# Patient Record
Sex: FEMALE | Race: WHITE | URBAN - METROPOLITAN AREA
[De-identification: names, ages, dates, MRNs, and addresses within clinical notes are randomized per-mention and may not be internally consistent; named-entity substitution may affect disease eponyms.]

---

## 2017-05-09 ENCOUNTER — INPATIENT (INPATIENT)
Facility: HOSPITAL | Age: 82
LOS: 2 days | Discharge: HOME | DRG: 292 | End: 2017-05-12
Attending: INTERNAL MEDICINE | Admitting: HOSPITALIST
Payer: MEDICARE

## 2017-05-09 VITALS
SYSTOLIC BLOOD PRESSURE: 144 MMHG | RESPIRATION RATE: 18 BRPM | OXYGEN SATURATION: 95 % | HEART RATE: 79 BPM | DIASTOLIC BLOOD PRESSURE: 62 MMHG | TEMPERATURE: 99 F

## 2017-05-09 LAB
ALBUMIN SERPL ELPH-MCNC: 3 G/DL — LOW (ref 3.3–5)
ALP SERPL-CCNC: 83 U/L — SIGNIFICANT CHANGE UP (ref 40–120)
ALT FLD-CCNC: 25 U/L RC — SIGNIFICANT CHANGE UP (ref 10–45)
ANION GAP SERPL CALC-SCNC: 12 MMOL/L — SIGNIFICANT CHANGE UP (ref 5–17)
APPEARANCE UR: CLEAR — SIGNIFICANT CHANGE UP
APTT BLD: 28.9 SEC — SIGNIFICANT CHANGE UP (ref 27.5–37.4)
AST SERPL-CCNC: 77 U/L — HIGH (ref 10–40)
BASOPHILS # BLD AUTO: 0.1 K/UL — SIGNIFICANT CHANGE UP (ref 0–0.2)
BILIRUB SERPL-MCNC: 0.7 MG/DL — SIGNIFICANT CHANGE UP (ref 0.2–1.2)
BILIRUB UR-MCNC: NEGATIVE — SIGNIFICANT CHANGE UP
BUN SERPL-MCNC: 21 MG/DL — SIGNIFICANT CHANGE UP (ref 7–23)
CALCIUM SERPL-MCNC: 8.6 MG/DL — SIGNIFICANT CHANGE UP (ref 8.4–10.5)
CHLORIDE SERPL-SCNC: 99 MMOL/L — SIGNIFICANT CHANGE UP (ref 96–108)
CO2 SERPL-SCNC: 22 MMOL/L — SIGNIFICANT CHANGE UP (ref 22–31)
COLOR SPEC: YELLOW — SIGNIFICANT CHANGE UP
CREAT SERPL-MCNC: 0.86 MG/DL — SIGNIFICANT CHANGE UP (ref 0.5–1.3)
DIFF PNL FLD: ABNORMAL
EOSINOPHIL # BLD AUTO: 0 K/UL — SIGNIFICANT CHANGE UP (ref 0–0.5)
GAS PNL BLDV: SIGNIFICANT CHANGE UP
GLUCOSE SERPL-MCNC: 123 MG/DL — HIGH (ref 70–99)
GLUCOSE UR QL: NEGATIVE — SIGNIFICANT CHANGE UP
HCT VFR BLD CALC: 34.5 % — SIGNIFICANT CHANGE UP (ref 34.5–45)
HGB BLD-MCNC: 11.8 G/DL — SIGNIFICANT CHANGE UP (ref 11.5–15.5)
INR BLD: 1.23 RATIO — HIGH (ref 0.88–1.16)
KETONES UR-MCNC: NEGATIVE — SIGNIFICANT CHANGE UP
LEUKOCYTE ESTERASE UR-ACNC: ABNORMAL
LYMPHOCYTES # BLD AUTO: 0.9 K/UL — LOW (ref 1–3.3)
LYMPHOCYTES # BLD AUTO: 9 % — LOW (ref 13–44)
MCHC RBC-ENTMCNC: 29 PG — SIGNIFICANT CHANGE UP (ref 27–34)
MCHC RBC-ENTMCNC: 34.2 GM/DL — SIGNIFICANT CHANGE UP (ref 32–36)
MCV RBC AUTO: 84.8 FL — SIGNIFICANT CHANGE UP (ref 80–100)
MONOCYTES # BLD AUTO: 0.4 K/UL — SIGNIFICANT CHANGE UP (ref 0–0.9)
MONOCYTES NFR BLD AUTO: 9 % — SIGNIFICANT CHANGE UP (ref 2–14)
NEUTROPHILS # BLD AUTO: 12.7 K/UL — HIGH (ref 1.8–7.4)
NEUTROPHILS NFR BLD AUTO: 78 % — HIGH (ref 43–77)
NITRITE UR-MCNC: NEGATIVE — SIGNIFICANT CHANGE UP
PH UR: 6 — SIGNIFICANT CHANGE UP (ref 5–8)
PLATELET # BLD AUTO: 129 K/UL — LOW (ref 150–400)
POTASSIUM SERPL-MCNC: 6.4 MMOL/L — CRITICAL HIGH (ref 3.5–5.3)
POTASSIUM SERPL-SCNC: 6.4 MMOL/L — CRITICAL HIGH (ref 3.5–5.3)
PROT SERPL-MCNC: 8.9 G/DL — HIGH (ref 6–8.3)
PROT UR-MCNC: 150 MG/DL
PROTHROM AB SERPL-ACNC: 13.3 SEC — HIGH (ref 9.8–12.7)
RBC # BLD: 4.07 M/UL — SIGNIFICANT CHANGE UP (ref 3.8–5.2)
RBC # FLD: 15.2 % — HIGH (ref 10.3–14.5)
SODIUM SERPL-SCNC: 133 MMOL/L — LOW (ref 135–145)
SP GR SPEC: 1.02 — SIGNIFICANT CHANGE UP (ref 1.01–1.02)
UROBILINOGEN FLD QL: NEGATIVE — SIGNIFICANT CHANGE UP
WBC # BLD: 14.4 K/UL — HIGH (ref 3.8–10.5)
WBC # FLD AUTO: 14.4 K/UL — HIGH (ref 3.8–10.5)

## 2017-05-09 PROCEDURE — 99285 EMERGENCY DEPT VISIT HI MDM: CPT

## 2017-05-09 PROCEDURE — 71010: CPT | Mod: 26

## 2017-05-09 RX ORDER — VANCOMYCIN HCL 1 G
1000 VIAL (EA) INTRAVENOUS ONCE
Qty: 0 | Refills: 0 | Status: COMPLETED | OUTPATIENT
Start: 2017-05-09 | End: 2017-05-09

## 2017-05-09 RX ORDER — SODIUM CHLORIDE 9 MG/ML
1000 INJECTION INTRAMUSCULAR; INTRAVENOUS; SUBCUTANEOUS ONCE
Qty: 0 | Refills: 0 | Status: COMPLETED | OUTPATIENT
Start: 2017-05-09 | End: 2017-05-09

## 2017-05-09 RX ADMIN — Medication 250 MILLIGRAM(S): at 23:45

## 2017-05-09 RX ADMIN — SODIUM CHLORIDE 1000 MILLILITER(S): 9 INJECTION INTRAMUSCULAR; INTRAVENOUS; SUBCUTANEOUS at 23:45

## 2017-05-09 RX ADMIN — SODIUM CHLORIDE 1000 MILLILITER(S): 9 INJECTION INTRAMUSCULAR; INTRAVENOUS; SUBCUTANEOUS at 22:33

## 2017-05-09 NOTE — ED PROVIDER NOTE - ATTENDING CONTRIBUTION TO CARE
I, Dr. Adrien Gee, saw and examined this patient and discussed the plan of care with the PA.  Elderly patient, dementia, high-functioning usually per family, presents with worsening reddness and swelling of left lower leg assoc with very poor intake and decreased activity over past 24 hours.  No vomiting, cp, sob.  Appears well, slightly dry mm, warm erythematous anterior left lower leg and midfoot with irregular borders c/w cellulitis, NV intact. I, Dr. Adrien Gee, saw and examined this patient and discussed the plan of care with the PA.  Elderly patient, dementia, high-functioning usually per family, presents with worsening reddness and swelling of left lower leg assoc with very poor intake and decreased activity over past 24 hours.  No vomiting, cp, sob.  Appears well, slightly dry mm, warm erythematous anterior left lower leg and midfoot with irregular borders c/w cellulitis, NV intact, has 4/6 systolic murmur L sternal border (states PCP mentioned this was new few weeks ago, family elected not to pursue this).

## 2017-05-09 NOTE — ED ADULT TRIAGE NOTE - CHIEF COMPLAINT QUOTE
dec po tolerance, lethargy, not taking meds} x since last night, (hx insomnia, dementia), dtr denies new confusion, hemiparesis

## 2017-05-09 NOTE — ED PROVIDER NOTE - MEDICAL DECISION MAKING DETAILS
Dr. Gee Note: left leg cellulitis in elderly with poor intake...will likely need admission for IV antibx and IV hydration.  Will check u/s for occult DVT as cause as well.

## 2017-05-09 NOTE — ED PROVIDER NOTE - OBJECTIVE STATEMENT
87 y/o F pmhx dementia, HTN, depression, presenting BIBEMS for daughters' concern for increased weakness and sleepiness as well as decreased PO intake and new onset of redness on left lower leg. Daughters provide history as patient has significant dementia, stating that patient is ambulatory in home without assistance at baseline, dressing and feeding herself as well. For last 2 days patient has become significantly weaker 89 y/o F pmhx dementia, HTN, depression, presenting BIBEMS for daughters' concern for increased weakness and sleepiness as well as decreased PO intake and new onset of redness on left lower leg. Daughters provide history as patient has significant dementia, stating that patient is ambulatory in home without assistance at baseline, dressing and feeding herself as well. For last 2 days patient has become significantly weaker and unable to get around independently which has become concerning for them. State that she has not wanted to eat or drink as well secondary to her weakness, and they noted that she had redness and pain to her LLE. Pt has been complaining of pain when trying to walk secondary to this redness. She denies any fevers or chills. Denies nausea, vomiting.

## 2017-05-09 NOTE — ED ADULT NURSE NOTE - OBJECTIVE STATEMENT
89 yo female A&OX3 presents to the ED with the c/o weakness. Pt has a hx of dementia, as per family members pt has been lethargic, weak and is having decrease po intake. Pt has redness and warmth to the LLE. As per family pt normally gets redness on her skin when she gets " bite by insects". No broken skin, no drainage or pus. Pt appears calm, resting in stretcher. Family states that pt has a hx of uti. No fevers or chills. Lungs clear, equal b/l no sob and no cp. Abd round, soft non tender and non distended.

## 2017-05-09 NOTE — ED PROVIDER NOTE - PHYSICAL EXAMINATION
GEN: Well Appearing, Nontoxic, NAD  HEENT: Symm Facies, PERRL, EOMI, MMM, posterior pharynx clear  CV: No JVD/Bruits or stridor;  RRR w/o m/g/r  RESP: CTAB w/o w/r/r  ABD: Soft, nt/nd, +bs, no guarding/rebound, no RUQ tender;  No CVAT  EXT: No lower extremity edema or calf tenderness. WWP, palpable pulses. FROMx4  SKIN: +erythema, warmth, mild swelling to shin. +ttp of area. 2+ pedal pulse.   Neuro: Grossly intact, AOX3 with normal speech, CN II-XII intact; Sensation intact, motor 5/5 throughout; gait normal

## 2017-05-09 NOTE — ED PROVIDER NOTE - CARE PLAN
Principal Discharge DX:	Cellulitis Principal Discharge DX:	Cellulitis of lower leg  Goal:	left leg cellulitis

## 2017-05-10 DIAGNOSIS — I50.1 LEFT VENTRICULAR FAILURE, UNSPECIFIED: ICD-10-CM

## 2017-05-10 DIAGNOSIS — G30.9 ALZHEIMER'S DISEASE, UNSPECIFIED: ICD-10-CM

## 2017-05-10 DIAGNOSIS — I10 ESSENTIAL (PRIMARY) HYPERTENSION: ICD-10-CM

## 2017-05-10 DIAGNOSIS — L03.119 CELLULITIS OF UNSPECIFIED PART OF LIMB: ICD-10-CM

## 2017-05-10 DIAGNOSIS — Z29.9 ENCOUNTER FOR PROPHYLACTIC MEASURES, UNSPECIFIED: ICD-10-CM

## 2017-05-10 DIAGNOSIS — Z92.89 PERSONAL HISTORY OF OTHER MEDICAL TREATMENT: Chronic | ICD-10-CM

## 2017-05-10 DIAGNOSIS — Z71.89 OTHER SPECIFIED COUNSELING: ICD-10-CM

## 2017-05-10 DIAGNOSIS — R82.90 UNSPECIFIED ABNORMAL FINDINGS IN URINE: ICD-10-CM

## 2017-05-10 LAB
CK MB BLD-MCNC: 1.2 % — SIGNIFICANT CHANGE UP (ref 0–3.5)
CK MB BLD-MCNC: 1.6 % — SIGNIFICANT CHANGE UP (ref 0–3.5)
CK MB CFR SERPL CALC: 2.9 NG/ML — SIGNIFICANT CHANGE UP (ref 0–3.8)
CK MB CFR SERPL CALC: 3.3 NG/ML — SIGNIFICANT CHANGE UP (ref 0–3.8)
CK SERPL-CCNC: 208 U/L — HIGH (ref 25–170)
CK SERPL-CCNC: 238 U/L — HIGH (ref 25–170)
HCT VFR BLD CALC: 33.8 % — LOW (ref 34.5–45)
HGB BLD-MCNC: 11.1 G/DL — LOW (ref 11.5–15.5)
MCHC RBC-ENTMCNC: 28.5 PG — SIGNIFICANT CHANGE UP (ref 27–34)
MCHC RBC-ENTMCNC: 32.9 GM/DL — SIGNIFICANT CHANGE UP (ref 32–36)
MCV RBC AUTO: 86.6 FL — SIGNIFICANT CHANGE UP (ref 80–100)
NT-PROBNP SERPL-SCNC: HIGH PG/ML (ref 0–300)
PLATELET # BLD AUTO: 122 K/UL — LOW (ref 150–400)
RBC # BLD: 3.9 M/UL — SIGNIFICANT CHANGE UP (ref 3.8–5.2)
RBC # FLD: 15 % — HIGH (ref 10.3–14.5)
TROPONIN T SERPL-MCNC: <0.01 NG/ML — SIGNIFICANT CHANGE UP (ref 0–0.06)
TROPONIN T SERPL-MCNC: <0.01 — SIGNIFICANT CHANGE UP (ref 0–0.06)
WBC # BLD: 13.7 K/UL — HIGH (ref 3.8–10.5)
WBC # FLD AUTO: 13.7 K/UL — HIGH (ref 3.8–10.5)

## 2017-05-10 PROCEDURE — 99497 ADVNCD CARE PLAN 30 MIN: CPT | Mod: 25,GC

## 2017-05-10 PROCEDURE — 71250 CT THORAX DX C-: CPT | Mod: 26

## 2017-05-10 PROCEDURE — 99223 1ST HOSP IP/OBS HIGH 75: CPT | Mod: GC

## 2017-05-10 PROCEDURE — 93971 EXTREMITY STUDY: CPT | Mod: 26

## 2017-05-10 RX ORDER — AMLODIPINE BESYLATE 2.5 MG/1
5 TABLET ORAL DAILY
Qty: 0 | Refills: 0 | Status: DISCONTINUED | OUTPATIENT
Start: 2017-05-10 | End: 2017-05-10

## 2017-05-10 RX ORDER — FUROSEMIDE 40 MG
40 TABLET ORAL ONCE
Qty: 0 | Refills: 0 | Status: COMPLETED | OUTPATIENT
Start: 2017-05-10 | End: 2017-05-10

## 2017-05-10 RX ORDER — HYDRALAZINE HCL 50 MG
25 TABLET ORAL ONCE
Qty: 0 | Refills: 0 | Status: DISCONTINUED | OUTPATIENT
Start: 2017-05-10 | End: 2017-05-10

## 2017-05-10 RX ORDER — FUROSEMIDE 40 MG
20 TABLET ORAL DAILY
Qty: 0 | Refills: 0 | Status: DISCONTINUED | OUTPATIENT
Start: 2017-05-10 | End: 2017-05-10

## 2017-05-10 RX ORDER — SODIUM CHLORIDE 9 MG/ML
1000 INJECTION, SOLUTION INTRAVENOUS
Qty: 0 | Refills: 0 | Status: DISCONTINUED | OUTPATIENT
Start: 2017-05-10 | End: 2017-05-10

## 2017-05-10 RX ORDER — HALOPERIDOL DECANOATE 100 MG/ML
1 INJECTION INTRAMUSCULAR ONCE
Qty: 0 | Refills: 0 | Status: COMPLETED | OUTPATIENT
Start: 2017-05-10 | End: 2017-05-10

## 2017-05-10 RX ORDER — ENOXAPARIN SODIUM 100 MG/ML
40 INJECTION SUBCUTANEOUS EVERY 24 HOURS
Qty: 0 | Refills: 0 | Status: DISCONTINUED | OUTPATIENT
Start: 2017-05-10 | End: 2017-05-12

## 2017-05-10 RX ORDER — ACETAMINOPHEN 500 MG
650 TABLET ORAL EVERY 6 HOURS
Qty: 0 | Refills: 0 | Status: DISCONTINUED | OUTPATIENT
Start: 2017-05-10 | End: 2017-05-12

## 2017-05-10 RX ORDER — HALOPERIDOL DECANOATE 100 MG/ML
0.5 INJECTION INTRAMUSCULAR ONCE
Qty: 0 | Refills: 0 | Status: DISCONTINUED | OUTPATIENT
Start: 2017-05-10 | End: 2017-05-10

## 2017-05-10 RX ORDER — ONDANSETRON 8 MG/1
4 TABLET, FILM COATED ORAL EVERY 6 HOURS
Qty: 0 | Refills: 0 | Status: DISCONTINUED | OUTPATIENT
Start: 2017-05-10 | End: 2017-05-10

## 2017-05-10 RX ORDER — HYDRALAZINE HCL 50 MG
25 TABLET ORAL THREE TIMES A DAY
Qty: 0 | Refills: 0 | Status: DISCONTINUED | OUTPATIENT
Start: 2017-05-10 | End: 2017-05-11

## 2017-05-10 RX ORDER — CEFUROXIME AXETIL 250 MG
500 TABLET ORAL EVERY 12 HOURS
Qty: 0 | Refills: 0 | Status: DISCONTINUED | OUTPATIENT
Start: 2017-05-10 | End: 2017-05-10

## 2017-05-10 RX ORDER — HYDRALAZINE HCL 50 MG
5 TABLET ORAL ONCE
Qty: 0 | Refills: 0 | Status: COMPLETED | OUTPATIENT
Start: 2017-05-10 | End: 2017-05-10

## 2017-05-10 RX ORDER — CEFUROXIME AXETIL 250 MG
750 TABLET ORAL EVERY 8 HOURS
Qty: 0 | Refills: 0 | Status: DISCONTINUED | OUTPATIENT
Start: 2017-05-10 | End: 2017-05-12

## 2017-05-10 RX ADMIN — Medication 25 MILLIGRAM(S): at 21:21

## 2017-05-10 RX ADMIN — Medication 20 MILLIGRAM(S): at 06:01

## 2017-05-10 RX ADMIN — Medication 5 MILLIGRAM(S): at 16:02

## 2017-05-10 RX ADMIN — AMLODIPINE BESYLATE 5 MILLIGRAM(S): 2.5 TABLET ORAL at 14:12

## 2017-05-10 RX ADMIN — Medication 500 MILLIGRAM(S): at 12:22

## 2017-05-10 RX ADMIN — Medication 100 MILLIGRAM(S): at 23:51

## 2017-05-10 RX ADMIN — ENOXAPARIN SODIUM 40 MILLIGRAM(S): 100 INJECTION SUBCUTANEOUS at 06:01

## 2017-05-10 RX ADMIN — Medication 40 MILLIGRAM(S): at 19:23

## 2017-05-10 RX ADMIN — HALOPERIDOL DECANOATE 1 MILLIGRAM(S): 100 INJECTION INTRAMUSCULAR at 23:10

## 2017-05-10 NOTE — H&P ADULT. - FAMILY HISTORY
Mother  Still living? Unknown  Family history of diabetes mellitus, Age at diagnosis: Age Unknown     Father  Still living? Unknown  Family history of essential hypertension, Age at diagnosis: Age Unknown

## 2017-05-10 NOTE — H&P ADULT. - PROBLEM SELECTOR PLAN 5
Patient has had dementia for quite some time without medication. Her baseline is such that she can make her needs known at the moment and can remember past events; however, cannot make new memories.   - No benefit to adding donepezil or memantine at this point  - If patient becomes agitated, cannot use antipsychotics 2/2 prolonged QTc Patient has had dementia for quite some time without medication. Her baseline is such that she can make her needs known at the moment and can remember past events; however, cannot make new memories.   - No benefit to adding donepezil or memantine at this point  - If patient becomes agitated, cannot use antipsychotics 2/2 prolonged QTc  - pt is prone to wandering, enhanced supervision

## 2017-05-10 NOTE — H&P ADULT. - PROBLEM SELECTOR PLAN 4
Patient currently at goal of < 150/90 without medication  - can trend VS throughout admission. No need to start new anti-HTN at this moment

## 2017-05-10 NOTE — H&P ADULT. - LYMPHATIC
anterior cervical L/posterior cervical R/posterior cervical L/supraclavicular L/anterior cervical R/supraclavicular R

## 2017-05-10 NOTE — H&P ADULT. - NEGATIVE OPHTHALMOLOGIC SYMPTOMS
no lacrimation L/no discharge L/no lacrimation R/no photophobia/no diplopia/no blurred vision R/no blurred vision L

## 2017-05-10 NOTE — H&P ADULT. - NEGATIVE CARDIOVASCULAR SYMPTOMS
no orthopnea/no dyspnea on exertion/no paroxysmal nocturnal dyspnea/no peripheral edema/no palpitations/no chest pain

## 2017-05-10 NOTE — H&P ADULT. - LAB RESULTS AND INTERPRETATION
labs reviewed and interpreted personally: leukocytosis to 14.4 with 4% bands and neutrophil predominance. H/H unremarkable. Platelets slights decreased at 132, no baseline to compare to. Coags slightly elevated with INR 1.32 and PT 13.3. Electrolytes cannot be interpreted as of now 2/2 gross specimen hemolysis. Repeat BMP has been sent. Creatinine preserved at 0.86. Patient normoglycemic. Elevated total protein of 8.9 with albumin of 3.0 yielding a gamma gap of ~6 which is likely 2/2 dehydration but could represent an elevated paraproteinemia. UA demonstrates mod bacteria, positive LE with hematuria and proteinuria c/w UTI.

## 2017-05-10 NOTE — H&P ADULT. - RADIOLOGY RESULTS AND INTERPRETATION
Images reviewed and interpreted personally. Official read pending. CXR demonstrated b/l non-specific opacities that could be increased vascular markings with small pleural effusions. CT w/o contrast shows mild pleural effusions with atelectasis and increased vascularity. Doubt pneumonia. Images reviewed and interpreted personally. Official read pending. CXR demonstrated b/l non-specific opacities that could be increased vascular markings with small pleural effusions. CT w/o contrast shows mild pleural effusions with atelectasis and increased vascularity. Doubt pneumonia. Cardiomegaly present.

## 2017-05-10 NOTE — H&P ADULT. - EKG AND INTERPRETATION
NSR at 77. LAE. QTc prolongation at 516 Personally reviewed EKG. NSR at 77. LAE. QTc prolongation at 516. RBBB.

## 2017-05-10 NOTE — H&P ADULT. - PROBLEM SELECTOR PLAN 6
lovenox - pt's two daughters are decision makers  - discussed GOC with Maureen Anna  - pt to be DNR but not DNI  - full medical management, but daughter would like overall conservative measures.

## 2017-05-10 NOTE — H&P ADULT. - HISTORY OF PRESENT ILLNESS
88F PMH HTN, depression, dementia presents with 2 day history of increasing weakness, sleepiness, decreased po intake and redness of the LLE. Daughter brought in patient by ambulance stating that her mother, at baseline, is interactive, self-ambulatory and can perform all ADL's independently. Over the last 2 days however, her mother is now having problems walking, endorsing pain with ambulation, is feeling weak and due to this weakness she is not eating very much.     In ED: T: 97.7  BP: 132/59  HR: 81  RR: 17  SpO2: 96%RA. Patient provided Vancomycin x 1, 2L NS bolus, CXR and CT chest w/o contrast. LLE duplex is pending and BCx and UCx have been sent. 88F PMH HTN, depression, dementia presents with 2 day history of increasing weakness, sleepiness, decreased po intake and redness of the LLE. Daughter brought in patient by ambulance stating that her mother, at baseline, is interactive, self-ambulatory and can perform all ADL's independently. Patient cannot form new memories; however, she is very verbal and can make her desires and needs known in the moment. Over the last 2 days however, her mother is now having problems walking, endorsing pain with ambulation, is feeling weak and due to this weakness she is not eating very much. Over the last 4 months the patient has been having a dry cough that has morphed into a wet cough that is not productive of sputum. 1 year ago, the patient developed a new heart murmur that has not received proper workup. The patient currently denies SOB, CP, fevers, chills, N/V/D/C, vision changes, hearing changes, weakness, muscle pain, joint pain, dysuria, hematuria, or HA.     In ED: T: 97.7  BP: 132/59  HR: 81  RR: 17  SpO2: 96%RA. Patient provided Vancomycin x 1, 2L NS bolus, CXR and CT chest w/o contrast. LLE duplex is pending and BCx and UCx have been sent.

## 2017-05-10 NOTE — H&P ADULT. - ASSESSMENT
88F PMH HTN, depression, dementia presents with 2 day history of increasing weakness, sleepiness, decreased po intake and redness of the LLE in the context of leukocytosis and UA consistent with UTI process. 88F PMH HTN, depression, dementia presents with 2 day history of increasing weakness, sleepiness, decreased po intake and redness of the LLE in the context of leukocytosis and pulmonary edema with new heart murmur and cardiomegaly seen on imaging.

## 2017-05-10 NOTE — H&P ADULT. - PROBLEM SELECTOR PLAN 1
Patient presents with new cardiac murmur, 4 months of a wet cough and a sudden decrease in energy and functional status with evidence of pulmonary edema, increased vascular markings on imaging with cardiomegaly. EKG is not suggestive of an acute event. This is strongly suggestive of a cardiac source for the patient's symptoms. Etiology can include ischemic vs nonischemic valvulopathy  - Admit patient to telemetry  - check TSH, a1c, lipid panel to risk stratify for CAD  - CE x 3  - TTE stat to assess for LV function and valve status  - Consider cardiology consult  - Lasix 20mg po x 1 and assess patients response as she is lasix naive at this point. Patient has enlarged LLE with calor, dolor, and rubor c/w a clinical diagnosis of simple cellulitis. There is no fluctuance or evidence of abscess formation.   - Would treat with Ceftin for basic staph/strept coverage for 5 days  - Erythema has been marked with pen. Assess evolution of erythema daily and switch to Vancomycin if there is significant spread as this implies resistant bacteria  - unclear portal of entry.

## 2017-05-10 NOTE — H&P ADULT. - MUSCULOSKELETAL
details… detailed exam no joint swelling/no calf tenderness/no joint warmth/ROM intact/no joint erythema

## 2017-05-10 NOTE — H&P ADULT. - PROBLEM SELECTOR PLAN 2
Patient has enlarged LLE with calor, dolor, and rubor c/w a clinical diagnosis of simple cellulitis. There is no fluctuance or evidence of abscess formation.   - Would treat with Ceftin for basic staph/strept coverage for 5 days  - Erythema has been marked with pen. Assess evolution of erythema daily and switch to Vancomycin if there is significant spread as this implies resistant bacteria  - unclear portal of entry. Patient presents with new cardiac murmur, 4 months of a wet cough and a sudden decrease in energy and functional status with evidence of pulmonary edema, increased vascular markings on imaging with cardiomegaly. EKG is not suggestive of an acute event. This is strongly suggestive of a cardiac source for the patient's symptoms. Etiology can include ischemic vs nonischemic valvulopathy  - Admit patient to telemetry  - check TSH, a1c, lipid panel to risk stratify for CAD  - CE x 3  - TTE stat to assess for LV function and valve status  - Consider cardiology consult  - Lasix 20mg po x 1 and assess patients response as she is lasix naive at this point.

## 2017-05-11 LAB
ANION GAP SERPL CALC-SCNC: 15 MMOL/L — SIGNIFICANT CHANGE UP (ref 5–17)
BUN SERPL-MCNC: 20 MG/DL — SIGNIFICANT CHANGE UP (ref 7–23)
BUN SERPL-MCNC: 21 MG/DL — SIGNIFICANT CHANGE UP (ref 7–23)
CALCIUM SERPL-MCNC: 8.8 MG/DL — SIGNIFICANT CHANGE UP (ref 8.4–10.5)
CALCIUM SERPL-MCNC: 8.9 MG/DL — SIGNIFICANT CHANGE UP (ref 8.4–10.5)
CHLORIDE SERPL-SCNC: 102 MMOL/L — SIGNIFICANT CHANGE UP (ref 96–108)
CHLORIDE SERPL-SCNC: 103 MMOL/L — SIGNIFICANT CHANGE UP (ref 96–108)
CHOLEST SERPL-MCNC: 153 MG/DL — SIGNIFICANT CHANGE UP (ref 10–199)
CO2 SERPL-SCNC: 24 MMOL/L — SIGNIFICANT CHANGE UP (ref 22–31)
CO2 SERPL-SCNC: 25 MMOL/L — SIGNIFICANT CHANGE UP (ref 22–31)
CREAT SERPL-MCNC: 0.68 MG/DL — SIGNIFICANT CHANGE UP (ref 0.5–1.3)
CREAT SERPL-MCNC: 0.72 MG/DL — SIGNIFICANT CHANGE UP (ref 0.5–1.3)
CULTURE RESULTS: SIGNIFICANT CHANGE UP
GLUCOSE SERPL-MCNC: 119 MG/DL — HIGH (ref 70–99)
GLUCOSE SERPL-MCNC: 95 MG/DL — SIGNIFICANT CHANGE UP (ref 70–99)
HBA1C BLD-MCNC: 6.4 % — HIGH (ref 4–5.6)
HCT VFR BLD CALC: 35.2 % — SIGNIFICANT CHANGE UP (ref 34.5–45)
HDLC SERPL-MCNC: 37 MG/DL — LOW (ref 40–125)
HGB BLD-MCNC: 12 G/DL — SIGNIFICANT CHANGE UP (ref 11.5–15.5)
LIPID PNL WITH DIRECT LDL SERPL: 97 MG/DL — SIGNIFICANT CHANGE UP
MAGNESIUM SERPL-MCNC: 1.9 MG/DL — SIGNIFICANT CHANGE UP (ref 1.6–2.6)
MCHC RBC-ENTMCNC: 28.9 PG — SIGNIFICANT CHANGE UP (ref 27–34)
MCHC RBC-ENTMCNC: 34 GM/DL — SIGNIFICANT CHANGE UP (ref 32–36)
MCV RBC AUTO: 85.1 FL — SIGNIFICANT CHANGE UP (ref 80–100)
PHOSPHATE SERPL-MCNC: 3.2 MG/DL — SIGNIFICANT CHANGE UP (ref 2.5–4.5)
PLATELET # BLD AUTO: 138 K/UL — LOW (ref 150–400)
POTASSIUM SERPL-MCNC: 2.8 MMOL/L — CRITICAL LOW (ref 3.5–5.3)
POTASSIUM SERPL-MCNC: 3.5 MMOL/L — SIGNIFICANT CHANGE UP (ref 3.5–5.3)
POTASSIUM SERPL-SCNC: 2.8 MMOL/L — CRITICAL LOW (ref 3.5–5.3)
POTASSIUM SERPL-SCNC: 3.5 MMOL/L — SIGNIFICANT CHANGE UP (ref 3.5–5.3)
RBC # BLD: 4.14 M/UL — SIGNIFICANT CHANGE UP (ref 3.8–5.2)
RBC # FLD: 14.9 % — HIGH (ref 10.3–14.5)
SODIUM SERPL-SCNC: 141 MMOL/L — SIGNIFICANT CHANGE UP (ref 135–145)
SODIUM SERPL-SCNC: 142 MMOL/L — SIGNIFICANT CHANGE UP (ref 135–145)
SPECIMEN SOURCE: SIGNIFICANT CHANGE UP
TOTAL CHOLESTEROL/HDL RATIO MEASUREMENT: 4 RATIO — SIGNIFICANT CHANGE UP (ref 3.3–7.1)
TRIGL SERPL-MCNC: 97 MG/DL — SIGNIFICANT CHANGE UP (ref 10–149)
TSH SERPL-MCNC: 3.96 UIU/ML — SIGNIFICANT CHANGE UP (ref 0.27–4.2)
WBC # BLD: 8.6 K/UL — SIGNIFICANT CHANGE UP (ref 3.8–10.5)
WBC # FLD AUTO: 8.6 K/UL — SIGNIFICANT CHANGE UP (ref 3.8–10.5)

## 2017-05-11 PROCEDURE — 93306 TTE W/DOPPLER COMPLETE: CPT | Mod: 26

## 2017-05-11 PROCEDURE — 99233 SBSQ HOSP IP/OBS HIGH 50: CPT | Mod: GC

## 2017-05-11 PROCEDURE — 99223 1ST HOSP IP/OBS HIGH 75: CPT

## 2017-05-11 RX ORDER — POTASSIUM CHLORIDE 20 MEQ
40 PACKET (EA) ORAL ONCE
Qty: 0 | Refills: 0 | Status: COMPLETED | OUTPATIENT
Start: 2017-05-11 | End: 2017-05-11

## 2017-05-11 RX ORDER — METOPROLOL TARTRATE 50 MG
25 TABLET ORAL
Qty: 0 | Refills: 0 | Status: DISCONTINUED | OUTPATIENT
Start: 2017-05-11 | End: 2017-05-12

## 2017-05-11 RX ADMIN — Medication 25 MILLIGRAM(S): at 13:21

## 2017-05-11 RX ADMIN — Medication 25 MILLIGRAM(S): at 19:03

## 2017-05-11 RX ADMIN — Medication 100 MILLIGRAM(S): at 07:45

## 2017-05-11 RX ADMIN — Medication 25 MILLIGRAM(S): at 05:28

## 2017-05-11 RX ADMIN — Medication 100 MILLIGRAM(S): at 13:21

## 2017-05-11 RX ADMIN — Medication 100 MILLIGRAM(S): at 22:35

## 2017-05-11 RX ADMIN — ENOXAPARIN SODIUM 40 MILLIGRAM(S): 100 INJECTION SUBCUTANEOUS at 05:27

## 2017-05-11 RX ADMIN — Medication 40 MILLIEQUIVALENT(S): at 13:21

## 2017-05-11 NOTE — PROVIDER CONTACT NOTE (OTHER) - ACTION/TREATMENT ORDERED:
As per MD 1mg haldol given IVP, abx reordered IVPB and given. Pt calm after haldol, will continue to monitor.

## 2017-05-12 ENCOUNTER — TRANSCRIPTION ENCOUNTER (OUTPATIENT)
Age: 82
End: 2017-05-12

## 2017-05-12 VITALS
DIASTOLIC BLOOD PRESSURE: 74 MMHG | RESPIRATION RATE: 18 BRPM | SYSTOLIC BLOOD PRESSURE: 161 MMHG | HEART RATE: 76 BPM | TEMPERATURE: 98 F | OXYGEN SATURATION: 98 %

## 2017-05-12 LAB
ANION GAP SERPL CALC-SCNC: 13 MMOL/L — SIGNIFICANT CHANGE UP (ref 5–17)
BUN SERPL-MCNC: 18 MG/DL — SIGNIFICANT CHANGE UP (ref 7–23)
CALCIUM SERPL-MCNC: 8.9 MG/DL — SIGNIFICANT CHANGE UP (ref 8.4–10.5)
CHLORIDE SERPL-SCNC: 102 MMOL/L — SIGNIFICANT CHANGE UP (ref 96–108)
CO2 SERPL-SCNC: 27 MMOL/L — SIGNIFICANT CHANGE UP (ref 22–31)
CREAT SERPL-MCNC: 0.62 MG/DL — SIGNIFICANT CHANGE UP (ref 0.5–1.3)
GLUCOSE SERPL-MCNC: 131 MG/DL — HIGH (ref 70–99)
POTASSIUM SERPL-MCNC: 3.3 MMOL/L — LOW (ref 3.5–5.3)
POTASSIUM SERPL-SCNC: 3.3 MMOL/L — LOW (ref 3.5–5.3)
SODIUM SERPL-SCNC: 142 MMOL/L — SIGNIFICANT CHANGE UP (ref 135–145)

## 2017-05-12 PROCEDURE — 97162 PT EVAL MOD COMPLEX 30 MIN: CPT

## 2017-05-12 PROCEDURE — 71250 CT THORAX DX C-: CPT

## 2017-05-12 PROCEDURE — 83880 ASSAY OF NATRIURETIC PEPTIDE: CPT

## 2017-05-12 PROCEDURE — 87086 URINE CULTURE/COLONY COUNT: CPT

## 2017-05-12 PROCEDURE — 99239 HOSP IP/OBS DSCHRG MGMT >30: CPT

## 2017-05-12 PROCEDURE — 82565 ASSAY OF CREATININE: CPT

## 2017-05-12 PROCEDURE — 85610 PROTHROMBIN TIME: CPT

## 2017-05-12 PROCEDURE — 87040 BLOOD CULTURE FOR BACTERIA: CPT

## 2017-05-12 PROCEDURE — 82803 BLOOD GASES ANY COMBINATION: CPT

## 2017-05-12 PROCEDURE — 96374 THER/PROPH/DIAG INJ IV PUSH: CPT

## 2017-05-12 PROCEDURE — 84484 ASSAY OF TROPONIN QUANT: CPT

## 2017-05-12 PROCEDURE — 85014 HEMATOCRIT: CPT

## 2017-05-12 PROCEDURE — 81001 URINALYSIS AUTO W/SCOPE: CPT

## 2017-05-12 PROCEDURE — 93971 EXTREMITY STUDY: CPT

## 2017-05-12 PROCEDURE — 80053 COMPREHEN METABOLIC PANEL: CPT

## 2017-05-12 PROCEDURE — 82435 ASSAY OF BLOOD CHLORIDE: CPT

## 2017-05-12 PROCEDURE — 85730 THROMBOPLASTIN TIME PARTIAL: CPT

## 2017-05-12 PROCEDURE — 71045 X-RAY EXAM CHEST 1 VIEW: CPT

## 2017-05-12 PROCEDURE — 84295 ASSAY OF SERUM SODIUM: CPT

## 2017-05-12 PROCEDURE — 84132 ASSAY OF SERUM POTASSIUM: CPT

## 2017-05-12 PROCEDURE — 99285 EMERGENCY DEPT VISIT HI MDM: CPT | Mod: 25

## 2017-05-12 PROCEDURE — 83036 HEMOGLOBIN GLYCOSYLATED A1C: CPT

## 2017-05-12 PROCEDURE — 80048 BASIC METABOLIC PNL TOTAL CA: CPT

## 2017-05-12 PROCEDURE — 82550 ASSAY OF CK (CPK): CPT

## 2017-05-12 PROCEDURE — 93306 TTE W/DOPPLER COMPLETE: CPT

## 2017-05-12 PROCEDURE — 82330 ASSAY OF CALCIUM: CPT

## 2017-05-12 PROCEDURE — 84443 ASSAY THYROID STIM HORMONE: CPT

## 2017-05-12 PROCEDURE — 80061 LIPID PANEL: CPT

## 2017-05-12 PROCEDURE — 82947 ASSAY GLUCOSE BLOOD QUANT: CPT

## 2017-05-12 PROCEDURE — 84100 ASSAY OF PHOSPHORUS: CPT

## 2017-05-12 PROCEDURE — 83735 ASSAY OF MAGNESIUM: CPT

## 2017-05-12 PROCEDURE — 82553 CREATINE MB FRACTION: CPT

## 2017-05-12 PROCEDURE — 85027 COMPLETE CBC AUTOMATED: CPT

## 2017-05-12 PROCEDURE — 83605 ASSAY OF LACTIC ACID: CPT

## 2017-05-12 RX ORDER — METOPROLOL TARTRATE 50 MG
1 TABLET ORAL
Qty: 60 | Refills: 0 | OUTPATIENT
Start: 2017-05-12 | End: 2017-06-11

## 2017-05-12 RX ORDER — POTASSIUM CHLORIDE 20 MEQ
40 PACKET (EA) ORAL ONCE
Qty: 0 | Refills: 0 | Status: COMPLETED | OUTPATIENT
Start: 2017-05-12 | End: 2017-05-12

## 2017-05-12 RX ORDER — POTASSIUM CHLORIDE 20 MEQ
40 PACKET (EA) ORAL ONCE
Qty: 0 | Refills: 0 | Status: DISCONTINUED | OUTPATIENT
Start: 2017-05-12 | End: 2017-05-12

## 2017-05-12 RX ORDER — CEFUROXIME AXETIL 250 MG
1 TABLET ORAL
Qty: 10 | Refills: 0 | OUTPATIENT
Start: 2017-05-12 | End: 2017-05-17

## 2017-05-12 RX ORDER — POTASSIUM CHLORIDE 20 MEQ
10 PACKET (EA) ORAL
Qty: 0 | Refills: 0 | Status: COMPLETED | OUTPATIENT
Start: 2017-05-12 | End: 2017-05-12

## 2017-05-12 RX ORDER — METOPROLOL TARTRATE 50 MG
1 TABLET ORAL
Qty: 0 | Refills: 0 | COMMUNITY
Start: 2017-05-12 | End: 2017-06-11

## 2017-05-12 RX ORDER — CEFUROXIME AXETIL 250 MG
500 TABLET ORAL EVERY 12 HOURS
Qty: 0 | Refills: 0 | Status: DISCONTINUED | OUTPATIENT
Start: 2017-05-12 | End: 2017-05-12

## 2017-05-12 RX ORDER — POTASSIUM CHLORIDE 20 MEQ
1 PACKET (EA) ORAL
Qty: 30 | Refills: 0 | OUTPATIENT
Start: 2017-05-12 | End: 2017-06-11

## 2017-05-12 RX ADMIN — Medication 40 MILLIEQUIVALENT(S): at 11:17

## 2017-05-12 RX ADMIN — Medication 100 MILLIGRAM(S): at 07:40

## 2017-05-12 RX ADMIN — ENOXAPARIN SODIUM 40 MILLIGRAM(S): 100 INJECTION SUBCUTANEOUS at 07:40

## 2017-05-12 RX ADMIN — Medication 100 MILLIEQUIVALENT(S): at 13:00

## 2017-05-12 RX ADMIN — Medication 100 MILLIEQUIVALENT(S): at 11:17

## 2017-05-12 RX ADMIN — Medication 25 MILLIGRAM(S): at 07:41

## 2017-05-12 NOTE — DISCHARGE NOTE ADULT - PLAN OF CARE
Please continue with Ceftin as prescribed You were diagnosed with a skin infection of your left lower leg. Please continue taking your antibiotic Ceftin as prescribed. Please follow up with Dr. Andino within one week of discharge from the hospital. If you experience fevers/chills, shortness of breath, chest pain, nausea, vomiting or worsening redness of your left lower extremity please seek medical assistance immediately. Please follow up with Dr. Serra- [phone] You were diagnosed with Severe aortic stenosis, moderate mitral regurgitaiton, and moderate mitral stenosis which are all the cause of your acute shortness of breath. Please continue taking Lopressor 25mg twice a day. Please follow up with Dr. Serra, the cardiologist who saw you in the hospital. The phone number is [phone]. Please continue taking your Donepezil as prescribed. Please follow Dr. Andino

## 2017-05-12 NOTE — PHYSICAL THERAPY INITIAL EVALUATION ADULT - ADDITIONAL COMMENTS
Pt is a poor historian therefore social hx obtained from  documentation: Patient resides in an apartment (elevator access, no stairs to enter) in [place], [place]. Patient is dependent with ADLs. Patient has 24/7  HHA services from [employer].

## 2017-05-12 NOTE — DISCHARGE NOTE ADULT - CARE PROVIDER_API CALL
Eric Serra), Cardiovascular Disease; Internal Medicine  [address] [place] [address]  Phone: [phone]  Fax: [phone]

## 2017-05-12 NOTE — PHYSICAL THERAPY INITIAL EVALUATION ADULT - PERTINENT HX OF CURRENT PROBLEM, REHAB EVAL
88F PMH HTN, depression, dementia presents with 2 day history of increasing weakness, sleepiness, decreased po intake and redness of the LLE in the context of leukocytosis and pulmonary edema with new heart murmur and cardiomegaly seen on imaging. 88F PMH HTN, depression, dementia presents with 2 day history of increasing weakness, sleepiness, decreased po intake and redness of the LLE in the context of leukocytosis and pulmonary edema with new heart murmur and cardiomegaly seen on imaging. CXR (5/9): cardiomegaly. Pt s/p TTE 5/11.

## 2017-05-12 NOTE — DISCHARGE NOTE ADULT - CARE PLAN
Principal Discharge DX:	Cellulitis of lower leg  Goal:	Please continue with Ceftin as prescribed  Instructions for follow-up, activity and diet:	You were diagnosed with a skin infection of your left lower leg. Please continue taking your antibiotic Ceftin as prescribed. Please follow up with Dr. Andino within one week of discharge from the hospital. If you experience fevers/chills, shortness of breath, chest pain, nausea, vomiting or worsening redness of your left lower extremity please seek medical assistance immediately.  Secondary Diagnosis:	Heart failure  Goal:	Please follow up with Dr. Serra- [phone]  Instructions for follow-up, activity and diet:	You were diagnosed with Severe aortic stenosis, moderate mitral regurgitaiton, and moderate mitral stenosis which are all the cause of your acute shortness of breath. Please continue taking Lopressor 25mg twice a day. Please follow up with Dr. Serra, the cardiologist who saw you in the hospital. The phone number is [phone].  Secondary Diagnosis:	Dementia  Instructions for follow-up, activity and diet:	Please continue taking your Donepezil as prescribed. Please follow Dr. Andino Principal Discharge DX:	Cellulitis of lower leg  Goal:	Please continue with Ceftin as prescribed  Instructions for follow-up, activity and diet:	You were diagnosed with a skin infection of your left lower leg. Please continue taking your antibiotic Ceftin as prescribed. Please follow up with Dr. Andino within one week of discharge from the hospital. If you experience fevers/chills, shortness of breath, chest pain, nausea, vomiting or worsening redness of your left lower extremity please seek medical assistance immediately.  Secondary Diagnosis:	Heart failure  Goal:	Please follow up with Dr. Serra- [phone]  Instructions for follow-up, activity and diet:	You were diagnosed with Severe aortic stenosis, moderate mitral regurgitaiton, and moderate mitral stenosis which are all the cause of your acute shortness of breath. Please continue taking Lopressor 25mg twice a day. Please follow up with Dr. Serra, the cardiologist who saw you in the hospital. The phone number is [phone].  Secondary Diagnosis:	Dementia  Instructions for follow-up, activity and diet:	Please continue taking your Donepezil as prescribed. Please follow Dr. Andino Principal Discharge DX:	Cellulitis of lower leg  Goal:	Please continue with Ceftin as prescribed  Instructions for follow-up, activity and diet:	You were diagnosed with a skin infection of your left lower leg. Please continue taking your antibiotic Ceftin as prescribed. Please follow up with Dr. Andino within one week of discharge from the hospital. If you experience fevers/chills, shortness of breath, chest pain, nausea, vomiting or worsening redness of your left lower extremity please seek medical assistance immediately.  Secondary Diagnosis:	Heart failure  Goal:	Please follow up with Dr. Serra- [phone]  Instructions for follow-up, activity and diet:	You were diagnosed with Severe aortic stenosis, moderate mitral regurgitaiton, and moderate mitral stenosis which are all the cause of your acute shortness of breath. Please continue taking Lopressor 25mg twice a day. Please follow up with Dr. Serra, the cardiologist who saw you in the hospital. The phone number is [phone].  Secondary Diagnosis:	Dementia  Instructions for follow-up, activity and diet:	Please continue taking your Donepezil as prescribed. Please follow Dr. Andino Principal Discharge DX:	Cellulitis of lower leg  Goal:	Please continue with Ceftin as prescribed  Instructions for follow-up, activity and diet:	You were diagnosed with a skin infection of your left lower leg. Please continue taking your antibiotic Ceftin as prescribed. Please follow up with Dr. Andino within one week of discharge from the hospital. If you experience fevers/chills, shortness of breath, chest pain, nausea, vomiting or worsening redness of your left lower extremity please seek medical assistance immediately.  Secondary Diagnosis:	Heart failure  Goal:	Please follow up with Dr. Serra- [phone]  Instructions for follow-up, activity and diet:	You were diagnosed with Severe aortic stenosis, moderate mitral regurgitaiton, and moderate mitral stenosis which are all the cause of your acute shortness of breath. Please continue taking Lopressor 25mg twice a day. Please follow up with Dr. Serra, the cardiologist who saw you in the hospital. The phone number is [phone].  Secondary Diagnosis:	Dementia  Instructions for follow-up, activity and diet:	Please continue taking your Donepezil as prescribed. Please follow Dr. Andino

## 2017-05-12 NOTE — PHYSICAL THERAPY INITIAL EVALUATION ADULT - DISCHARGE DISPOSITION, PT EVAL
Home with home PT for gait, balance, & strength training and to return pt to baseline functional mobility status. Rolling walker with ambulation (pt does not own). Supervision/assist with mobility skills & ADLs (pt has 24/7 HHA). **Pt cleared for d/c home from PT perspective at this time./home w/ home PT/home w/ assist

## 2017-05-12 NOTE — DISCHARGE NOTE ADULT - HOSPITAL COURSE
88F PMH HTN, depression, dementia presents with 2 day history of increasing weakness, sleepiness, decreased po intake and redness of the LLE. Daughter brought in patient by ambulance stating that her mother, at baseline, is interactive, self-ambulatory and can perform all ADL's independently. Patient cannot form new memories; however, she is very verbal and can make her desires and needs known in the moment. Over the last 2 days however, her mother is now having problems walking, endorsing pain with ambulation, is feeling weak and due to this weakness she is not eating very much. Over the last 4 months the patient has been having a dry cough that has morphed into a wet cough that is not productive of sputum. 1 year ago, the patient developed a new heart murmur that has not received proper workup.     Hospital Course:    For the cellulitis the patient was started on Oral Ceftin which she tolerated well and the cellulitis began to improve in terms of calor, warmth, and how far up the leg it was. The patient was discharged home on oral Ceftin for a 7 day total course.    The patients pulmonary edema was treated with IV lasix 20mg- once. The patient SOB improved. TTE showed Mitral annular calcification and calcified mitral leaflets with decreased diastolic opening. Moderate mitral regurgitation. Calcified trileaflet aortic valve with decreased opening. Peak transaortic valve gradient equals 74 mm Hg,  mean transaortic valve gradient equals 47 mm Hg, estimated aortic valve area equals 0.7 sqcm (by continuity equation), aortic valve velocity time integral equals 98 cm,  consistent with severe aortic stenosis.    Cardiology evaluated the patient and recommended Lopressor 25mg BID. The patient will have to follow up with Cardiology as an outpatient regarding TAVR eval and GOC.   The patient was seen by physical therapy who recommended home with a Rollator walker with a seat. The [place] [id number] home health aide was reinstated and the patient was discharged home with her daughter. The patient was medically stable and optimized for discharge.

## 2017-05-12 NOTE — PHYSICAL THERAPY INITIAL EVALUATION ADULT - GENERAL OBSERVATIONS, REHAB EVAL
Pt received semi-supine in bed, eating breakfast, (+) bed alarm, (+) PICU monitoring. Pt pleasantly confused, agreeable to PT eval.

## 2017-05-12 NOTE — DISCHARGE NOTE ADULT - MEDICATION SUMMARY - MEDICATIONS TO TAKE
I will START or STAY ON the medications listed below when I get home from the hospital:    roller  -- Dispense 1 Rollator Walker with a Seat  ICD R26.2  -- Indication: For Gait instability    metoprolol tartrate 25 mg oral tablet  -- 1 tab(s) by mouth 2 times a day  -- Indication: For Heart failure    cefuroxime 500 mg oral tablet  -- 1 tab(s) by mouth every 12 hours  -- Indication: For Cellulitis of extremity    potassium chloride 10 mEq oral tablet, extended release  -- 1 tab(s) by mouth once a day  -- It is very important that you take or use this exactly as directed.  Do not skip doses or discontinue unless directed by your doctor.  Medication should be taken with plenty of water.  Take with food or milk.    -- Indication: For Heart failure

## 2017-05-15 LAB
CULTURE RESULTS: SIGNIFICANT CHANGE UP
CULTURE RESULTS: SIGNIFICANT CHANGE UP
SPECIMEN SOURCE: SIGNIFICANT CHANGE UP
SPECIMEN SOURCE: SIGNIFICANT CHANGE UP

## 2018-05-01 ENCOUNTER — OUTPATIENT (OUTPATIENT)
Dept: OUTPATIENT SERVICES | Facility: HOSPITAL | Age: 83
LOS: 1 days | End: 2018-05-01
Payer: MEDICAID

## 2018-05-01 DIAGNOSIS — Z92.89 PERSONAL HISTORY OF OTHER MEDICAL TREATMENT: Chronic | ICD-10-CM

## 2018-05-01 PROCEDURE — G9001: CPT

## 2018-05-16 ENCOUNTER — INPATIENT (INPATIENT)
Facility: HOSPITAL | Age: 83
LOS: 4 days | Discharge: HOME | DRG: 871 | End: 2018-05-21
Attending: HOSPITALIST | Admitting: INTERNAL MEDICINE
Payer: MEDICARE

## 2018-05-16 ENCOUNTER — EMERGENCY (EMERGENCY)
Facility: HOSPITAL | Age: 83
LOS: 1 days | Discharge: ANOTHER HOSPITAL | End: 2018-05-16
Attending: EMERGENCY MEDICINE
Payer: MEDICARE

## 2018-05-16 VITALS
DIASTOLIC BLOOD PRESSURE: 53 MMHG | HEART RATE: 108 BPM | OXYGEN SATURATION: 97 % | SYSTOLIC BLOOD PRESSURE: 106 MMHG | RESPIRATION RATE: 20 BRPM

## 2018-05-16 VITALS
HEART RATE: 122 BPM | DIASTOLIC BLOOD PRESSURE: 73 MMHG | TEMPERATURE: 99 F | OXYGEN SATURATION: 97 % | SYSTOLIC BLOOD PRESSURE: 147 MMHG | WEIGHT: 108.91 LBS | RESPIRATION RATE: 22 BRPM

## 2018-05-16 VITALS
SYSTOLIC BLOOD PRESSURE: 146 MMHG | RESPIRATION RATE: 21 BRPM | DIASTOLIC BLOOD PRESSURE: 64 MMHG | TEMPERATURE: 98 F | OXYGEN SATURATION: 98 % | HEART RATE: 121 BPM

## 2018-05-16 DIAGNOSIS — Z92.89 PERSONAL HISTORY OF OTHER MEDICAL TREATMENT: Chronic | ICD-10-CM

## 2018-05-16 LAB
ALBUMIN SERPL ELPH-MCNC: 2.3 G/DL — LOW (ref 3.5–5)
ALP SERPL-CCNC: 103 U/L — SIGNIFICANT CHANGE UP (ref 40–120)
ALT FLD-CCNC: 19 U/L DA — SIGNIFICANT CHANGE UP (ref 10–60)
ANION GAP SERPL CALC-SCNC: 13 MMOL/L — SIGNIFICANT CHANGE UP (ref 5–17)
AST SERPL-CCNC: 34 U/L — SIGNIFICANT CHANGE UP (ref 10–40)
BASOPHILS # BLD AUTO: 0.2 K/UL — SIGNIFICANT CHANGE UP (ref 0–0.2)
BASOPHILS NFR BLD AUTO: 1.6 % — SIGNIFICANT CHANGE UP (ref 0–2)
BILIRUB SERPL-MCNC: 0.9 MG/DL — SIGNIFICANT CHANGE UP (ref 0.2–1.2)
BUN SERPL-MCNC: 49 MG/DL — HIGH (ref 7–18)
CALCIUM SERPL-MCNC: 9 MG/DL — SIGNIFICANT CHANGE UP (ref 8.4–10.5)
CHLORIDE SERPL-SCNC: 101 MMOL/L — SIGNIFICANT CHANGE UP (ref 96–108)
CO2 SERPL-SCNC: 20 MMOL/L — LOW (ref 22–31)
CREAT SERPL-MCNC: 1.41 MG/DL — HIGH (ref 0.5–1.3)
EOSINOPHIL # BLD AUTO: 0 K/UL — SIGNIFICANT CHANGE UP (ref 0–0.5)
EOSINOPHIL NFR BLD AUTO: 0 % — SIGNIFICANT CHANGE UP (ref 0–6)
GLUCOSE SERPL-MCNC: 236 MG/DL — HIGH (ref 70–99)
HCT VFR BLD CALC: 38.2 % — SIGNIFICANT CHANGE UP (ref 34.5–45)
HGB BLD-MCNC: 12.4 G/DL — SIGNIFICANT CHANGE UP (ref 11.5–15.5)
LACTATE SERPL-SCNC: 2.5 MMOL/L — HIGH (ref 0.7–2)
LACTATE SERPL-SCNC: 4.1 MMOL/L — CRITICAL HIGH (ref 0.7–2)
LYMPHOCYTES # BLD AUTO: 0.4 K/UL — LOW (ref 1–3.3)
LYMPHOCYTES # BLD AUTO: 3 % — LOW (ref 13–44)
MCHC RBC-ENTMCNC: 29.5 PG — SIGNIFICANT CHANGE UP (ref 27–34)
MCHC RBC-ENTMCNC: 32.4 GM/DL — SIGNIFICANT CHANGE UP (ref 32–36)
MCV RBC AUTO: 91 FL — SIGNIFICANT CHANGE UP (ref 80–100)
MONOCYTES # BLD AUTO: 0.9 K/UL — SIGNIFICANT CHANGE UP (ref 0–0.9)
MONOCYTES NFR BLD AUTO: 6.5 % — SIGNIFICANT CHANGE UP (ref 2–14)
NEUTROPHILS # BLD AUTO: 12.7 K/UL — HIGH (ref 1.8–7.4)
NEUTROPHILS NFR BLD AUTO: 88.8 % — HIGH (ref 43–77)
NT-PROBNP SERPL-SCNC: HIGH PG/ML (ref 0–450)
PLATELET # BLD AUTO: 233 K/UL — SIGNIFICANT CHANGE UP (ref 150–400)
POTASSIUM SERPL-MCNC: 3.5 MMOL/L — SIGNIFICANT CHANGE UP (ref 3.5–5.3)
POTASSIUM SERPL-SCNC: 3.5 MMOL/L — SIGNIFICANT CHANGE UP (ref 3.5–5.3)
PROT SERPL-MCNC: 9.3 G/DL — HIGH (ref 6–8.3)
RBC # BLD: 4.19 M/UL — SIGNIFICANT CHANGE UP (ref 3.8–5.2)
RBC # FLD: 15.2 % — HIGH (ref 10.3–14.5)
SODIUM SERPL-SCNC: 134 MMOL/L — LOW (ref 135–145)
TROPONIN I SERPL-MCNC: 0.86 NG/ML — HIGH (ref 0–0.04)
WBC # BLD: 14.3 K/UL — HIGH (ref 3.8–10.5)
WBC # FLD AUTO: 14.3 K/UL — HIGH (ref 3.8–10.5)

## 2018-05-16 PROCEDURE — 93010 ELECTROCARDIOGRAM REPORT: CPT

## 2018-05-16 PROCEDURE — 99285 EMERGENCY DEPT VISIT HI MDM: CPT | Mod: 25

## 2018-05-16 PROCEDURE — 71045 X-RAY EXAM CHEST 1 VIEW: CPT | Mod: 26

## 2018-05-16 PROCEDURE — 99291 CRITICAL CARE FIRST HOUR: CPT

## 2018-05-16 PROCEDURE — 99292 CRITICAL CARE ADDL 30 MIN: CPT

## 2018-05-16 RX ORDER — HEPARIN SODIUM 5000 [USP'U]/ML
2900 INJECTION INTRAVENOUS; SUBCUTANEOUS EVERY 6 HOURS
Qty: 0 | Refills: 0 | Status: DISCONTINUED | OUTPATIENT
Start: 2018-05-16 | End: 2018-05-20

## 2018-05-16 RX ORDER — ASPIRIN/CALCIUM CARB/MAGNESIUM 324 MG
325 TABLET ORAL ONCE
Qty: 0 | Refills: 0 | Status: DISCONTINUED | OUTPATIENT
Start: 2018-05-16 | End: 2018-05-16

## 2018-05-16 RX ORDER — ACETAMINOPHEN 500 MG
650 TABLET ORAL ONCE
Qty: 0 | Refills: 0 | Status: DISCONTINUED | OUTPATIENT
Start: 2018-05-16 | End: 2018-05-20

## 2018-05-16 RX ORDER — CEFTRIAXONE 500 MG/1
1 INJECTION, POWDER, FOR SOLUTION INTRAMUSCULAR; INTRAVENOUS ONCE
Qty: 0 | Refills: 0 | Status: COMPLETED | OUTPATIENT
Start: 2018-05-16 | End: 2018-05-16

## 2018-05-16 RX ORDER — HEPARIN SODIUM 5000 [USP'U]/ML
2900 INJECTION INTRAVENOUS; SUBCUTANEOUS ONCE
Qty: 0 | Refills: 0 | Status: COMPLETED | OUTPATIENT
Start: 2018-05-16 | End: 2018-05-16

## 2018-05-16 RX ORDER — ALBUTEROL 90 UG/1
2.5 AEROSOL, METERED ORAL ONCE
Qty: 0 | Refills: 0 | Status: COMPLETED | OUTPATIENT
Start: 2018-05-16 | End: 2018-05-16

## 2018-05-16 RX ORDER — AZITHROMYCIN 500 MG/1
500 TABLET, FILM COATED ORAL ONCE
Qty: 0 | Refills: 0 | Status: COMPLETED | OUTPATIENT
Start: 2018-05-16 | End: 2018-05-16

## 2018-05-16 RX ORDER — ASPIRIN/CALCIUM CARB/MAGNESIUM 324 MG
325 TABLET ORAL ONCE
Qty: 0 | Refills: 0 | Status: COMPLETED | OUTPATIENT
Start: 2018-05-16 | End: 2018-05-16

## 2018-05-16 RX ORDER — SODIUM CHLORIDE 9 MG/ML
1000 INJECTION INTRAMUSCULAR; INTRAVENOUS; SUBCUTANEOUS ONCE
Qty: 0 | Refills: 0 | Status: DISCONTINUED | OUTPATIENT
Start: 2018-05-16 | End: 2018-05-16

## 2018-05-16 RX ORDER — SODIUM CHLORIDE 9 MG/ML
1000 INJECTION INTRAMUSCULAR; INTRAVENOUS; SUBCUTANEOUS ONCE
Qty: 0 | Refills: 0 | Status: COMPLETED | OUTPATIENT
Start: 2018-05-16 | End: 2018-05-16

## 2018-05-16 RX ORDER — MORPHINE SULFATE 50 MG/1
4 CAPSULE, EXTENDED RELEASE ORAL ONCE
Qty: 0 | Refills: 0 | Status: DISCONTINUED | OUTPATIENT
Start: 2018-05-16 | End: 2018-05-16

## 2018-05-16 RX ORDER — HEPARIN SODIUM 5000 [USP'U]/ML
INJECTION INTRAVENOUS; SUBCUTANEOUS
Qty: 25000 | Refills: 0 | Status: DISCONTINUED | OUTPATIENT
Start: 2018-05-16 | End: 2018-05-20

## 2018-05-16 RX ADMIN — HEPARIN SODIUM 600 UNIT(S)/HR: 5000 INJECTION INTRAVENOUS; SUBCUTANEOUS at 22:26

## 2018-05-16 RX ADMIN — AZITHROMYCIN 250 MILLIGRAM(S): 500 TABLET, FILM COATED ORAL at 21:16

## 2018-05-16 RX ADMIN — MORPHINE SULFATE 4 MILLIGRAM(S): 50 CAPSULE, EXTENDED RELEASE ORAL at 22:15

## 2018-05-16 RX ADMIN — CEFTRIAXONE 100 GRAM(S): 500 INJECTION, POWDER, FOR SOLUTION INTRAMUSCULAR; INTRAVENOUS at 21:13

## 2018-05-16 RX ADMIN — SODIUM CHLORIDE 2000 MILLILITER(S): 9 INJECTION INTRAMUSCULAR; INTRAVENOUS; SUBCUTANEOUS at 20:09

## 2018-05-16 RX ADMIN — HEPARIN SODIUM 2900 UNIT(S): 5000 INJECTION INTRAVENOUS; SUBCUTANEOUS at 22:25

## 2018-05-16 RX ADMIN — ALBUTEROL 2.5 MILLIGRAM(S): 90 AEROSOL, METERED ORAL at 20:07

## 2018-05-16 RX ADMIN — Medication 325 MILLIGRAM(S): at 22:11

## 2018-05-16 NOTE — ED PROVIDER NOTE - CARE PLAN
Principal Discharge DX:	Pneumonia  Secondary Diagnosis:	Severe sepsis Principal Discharge DX:	STEMI (ST elevation myocardial infarction)  Secondary Diagnosis:	Severe sepsis  Secondary Diagnosis:	Pneumonia

## 2018-05-16 NOTE — ED PROVIDER NOTE - OBJECTIVE STATEMENT
89yoF with h/o HTN, dementia, and an unknown chronic lung condition p/w SOB x3 days. Associated chest congestion and slower mentation than usual. Sent in by PMD to r/o PNA. Denies fever and CP though family states she denies all symptoms to doctors.  Meds: Metoprolol, Zoloft

## 2018-05-16 NOTE — ED PROVIDER NOTE - MEDICAL DECISION MAKING DETAILS
ECG ST elevation in aVR and V1 with lateral ST depressions, troponin elevation, with SOB, concerning for STEMI. ECG ST elevation in aVR and V1 with lateral ST depressions, troponin elevation, with SOB, as well as concern for development of pulmonary edema on CXR, concerning for STEMI. D/w Dr. Beavers cardiology fellow at [place] who reviewed ECG and stated low suspicion for STEMI and patient would not proceed to cath lab at this time and would evaluate pt in the ED. Patient later became diaphoretic and uncomfortable appearing and more SOB, now desatting on RA. Rediscussed with Dr. Beavers at this time and reiterated low STEMI suspicion and would evaluate patient in ED on arrival. Given ASA and heparin and Dr. Beavers recommended against ticagrelor at this time. Of note, patient met sepsis criteria though have lower suspicion for PNA at this point, and also given ceftriaxone/azith  for initial PNA concern, not given 30/kg fluid bolus in light of concern for fluid overloading the patient. Patient hemodynamically stable at this time, requiring O2. Transferred to [place] ED.

## 2018-05-16 NOTE — ED ADULT NURSE REASSESSMENT NOTE - NS ED NURSE REASSESS COMMENT FT1
pt to be transferred to [facility], heparin drip started @ 600 units/hr. 2900 units bolus given. pt with dementia and is extremely confused. Daughters at bedside. Ambulance has arriver=ed to transport pt to Hospital

## 2018-05-16 NOTE — ED PROVIDER NOTE - PHYSICAL EXAMINATION
Afebrile, hemodynamically stable, saturating well  NAD, no increased WOB, well appearing  Head NCAT  EOMI grossly, anicteric  MM dry  No JVD  RRR, nml S1/S2, no m/r/g  Lungs diffuse rales  Abd soft, NT, ND, nml BS, no rebound or guarding  Alert, CN's 3-12 grossly intact  MALDONADO spontaneously, no leg cyanosis or edema  Skin warm, dry, no rashes or hives

## 2018-05-17 DIAGNOSIS — Z29.9 ENCOUNTER FOR PROPHYLACTIC MEASURES, UNSPECIFIED: ICD-10-CM

## 2018-05-17 DIAGNOSIS — F32.9 MAJOR DEPRESSIVE DISORDER, SINGLE EPISODE, UNSPECIFIED: ICD-10-CM

## 2018-05-17 DIAGNOSIS — J18.9 PNEUMONIA, UNSPECIFIED ORGANISM: ICD-10-CM

## 2018-05-17 DIAGNOSIS — Z71.89 OTHER SPECIFIED COUNSELING: ICD-10-CM

## 2018-05-17 DIAGNOSIS — I21.4 NON-ST ELEVATION (NSTEMI) MYOCARDIAL INFARCTION: ICD-10-CM

## 2018-05-17 DIAGNOSIS — T17.908A UNSPECIFIED FOREIGN BODY IN RESPIRATORY TRACT, PART UNSPECIFIED CAUSING OTHER INJURY, INITIAL ENCOUNTER: ICD-10-CM

## 2018-05-17 DIAGNOSIS — G93.41 METABOLIC ENCEPHALOPATHY: ICD-10-CM

## 2018-05-17 DIAGNOSIS — Z51.5 ENCOUNTER FOR PALLIATIVE CARE: ICD-10-CM

## 2018-05-17 DIAGNOSIS — J96.01 ACUTE RESPIRATORY FAILURE WITH HYPOXIA: ICD-10-CM

## 2018-05-17 LAB
ALBUMIN SERPL ELPH-MCNC: 2.5 G/DL — LOW (ref 3.3–5)
ALBUMIN SERPL ELPH-MCNC: 2.7 G/DL — LOW (ref 3.3–5)
ALP SERPL-CCNC: 86 U/L — SIGNIFICANT CHANGE UP (ref 40–120)
ALP SERPL-CCNC: 89 U/L — SIGNIFICANT CHANGE UP (ref 40–120)
ALT FLD-CCNC: 13 U/L — SIGNIFICANT CHANGE UP (ref 10–45)
ALT FLD-CCNC: 14 U/L — SIGNIFICANT CHANGE UP (ref 10–45)
ANION GAP SERPL CALC-SCNC: 14 MMOL/L — SIGNIFICANT CHANGE UP (ref 5–17)
ANION GAP SERPL CALC-SCNC: 17 MMOL/L — SIGNIFICANT CHANGE UP (ref 5–17)
APTT BLD: 25.2 SEC — LOW (ref 27.5–37.4)
AST SERPL-CCNC: 23 U/L — SIGNIFICANT CHANGE UP (ref 10–40)
AST SERPL-CCNC: 29 U/L — SIGNIFICANT CHANGE UP (ref 10–40)
BASOPHILS # BLD AUTO: 0 K/UL — SIGNIFICANT CHANGE UP (ref 0–0.2)
BILIRUB SERPL-MCNC: 0.6 MG/DL — SIGNIFICANT CHANGE UP (ref 0.2–1.2)
BILIRUB SERPL-MCNC: 0.7 MG/DL — SIGNIFICANT CHANGE UP (ref 0.2–1.2)
BUN SERPL-MCNC: 48 MG/DL — HIGH (ref 7–23)
BUN SERPL-MCNC: 48 MG/DL — HIGH (ref 7–23)
CALCIUM SERPL-MCNC: 8.6 MG/DL — SIGNIFICANT CHANGE UP (ref 8.4–10.5)
CALCIUM SERPL-MCNC: 9 MG/DL — SIGNIFICANT CHANGE UP (ref 8.4–10.5)
CHLORIDE SERPL-SCNC: 100 MMOL/L — SIGNIFICANT CHANGE UP (ref 96–108)
CHLORIDE SERPL-SCNC: 100 MMOL/L — SIGNIFICANT CHANGE UP (ref 96–108)
CK MB CFR SERPL CALC: 7.6 NG/ML — HIGH (ref 0–3.8)
CK SERPL-CCNC: 87 U/L — SIGNIFICANT CHANGE UP (ref 25–170)
CO2 SERPL-SCNC: 21 MMOL/L — LOW (ref 22–31)
CO2 SERPL-SCNC: 22 MMOL/L — SIGNIFICANT CHANGE UP (ref 22–31)
CREAT SERPL-MCNC: 1.01 MG/DL — SIGNIFICANT CHANGE UP (ref 0.5–1.3)
CREAT SERPL-MCNC: 1.07 MG/DL — SIGNIFICANT CHANGE UP (ref 0.5–1.3)
EOSINOPHIL # BLD AUTO: 0 K/UL — SIGNIFICANT CHANGE UP (ref 0–0.5)
GAS PNL BLDV: SIGNIFICANT CHANGE UP
GLUCOSE SERPL-MCNC: 172 MG/DL — HIGH (ref 70–99)
GLUCOSE SERPL-MCNC: 207 MG/DL — HIGH (ref 70–99)
HCT VFR BLD CALC: 34.8 % — SIGNIFICANT CHANGE UP (ref 34.5–45)
HCT VFR BLD CALC: 35.5 % — SIGNIFICANT CHANGE UP (ref 34.5–45)
HGB BLD-MCNC: 11 G/DL — LOW (ref 11.5–15.5)
HGB BLD-MCNC: 11.4 G/DL — LOW (ref 11.5–15.5)
LYMPHOCYTES # BLD AUTO: 0.6 K/UL — LOW (ref 1–3.3)
LYMPHOCYTES # BLD AUTO: 13 % — SIGNIFICANT CHANGE UP (ref 13–44)
MAGNESIUM SERPL-MCNC: 2.4 MG/DL — SIGNIFICANT CHANGE UP (ref 1.6–2.6)
MCHC RBC-ENTMCNC: 28.9 PG — SIGNIFICANT CHANGE UP (ref 27–34)
MCHC RBC-ENTMCNC: 29.6 PG — SIGNIFICANT CHANGE UP (ref 27–34)
MCHC RBC-ENTMCNC: 31.6 GM/DL — LOW (ref 32–36)
MCHC RBC-ENTMCNC: 32.2 GM/DL — SIGNIFICANT CHANGE UP (ref 32–36)
MCV RBC AUTO: 91.6 FL — SIGNIFICANT CHANGE UP (ref 80–100)
MCV RBC AUTO: 91.9 FL — SIGNIFICANT CHANGE UP (ref 80–100)
MONOCYTES # BLD AUTO: 0.5 K/UL — SIGNIFICANT CHANGE UP (ref 0–0.9)
MONOCYTES NFR BLD AUTO: 10 % — SIGNIFICANT CHANGE UP (ref 2–14)
NEUTROPHILS # BLD AUTO: 12 K/UL — HIGH (ref 1.8–7.4)
NEUTROPHILS NFR BLD AUTO: 75 % — SIGNIFICANT CHANGE UP (ref 43–77)
PLATELET # BLD AUTO: 204 K/UL — SIGNIFICANT CHANGE UP (ref 150–400)
PLATELET # BLD AUTO: 221 K/UL — SIGNIFICANT CHANGE UP (ref 150–400)
POTASSIUM SERPL-MCNC: 3.2 MMOL/L — LOW (ref 3.5–5.3)
POTASSIUM SERPL-MCNC: 3.4 MMOL/L — LOW (ref 3.5–5.3)
POTASSIUM SERPL-SCNC: 3.2 MMOL/L — LOW (ref 3.5–5.3)
POTASSIUM SERPL-SCNC: 3.4 MMOL/L — LOW (ref 3.5–5.3)
PROT SERPL-MCNC: 7.9 G/DL — SIGNIFICANT CHANGE UP (ref 6–8.3)
PROT SERPL-MCNC: 8.3 G/DL — SIGNIFICANT CHANGE UP (ref 6–8.3)
RAPID RVP RESULT: DETECTED
RBC # BLD: 3.8 M/UL — SIGNIFICANT CHANGE UP (ref 3.8–5.2)
RBC # BLD: 3.87 M/UL — SIGNIFICANT CHANGE UP (ref 3.8–5.2)
RBC # FLD: 14.2 % — SIGNIFICANT CHANGE UP (ref 10.3–14.5)
RBC # FLD: 14.2 % — SIGNIFICANT CHANGE UP (ref 10.3–14.5)
RV+EV RNA SPEC QL NAA+PROBE: DETECTED
SODIUM SERPL-SCNC: 136 MMOL/L — SIGNIFICANT CHANGE UP (ref 135–145)
SODIUM SERPL-SCNC: 138 MMOL/L — SIGNIFICANT CHANGE UP (ref 135–145)
TROPONIN T SERPL-MCNC: 0.09 NG/ML — HIGH (ref 0–0.06)
WBC # BLD: 14 K/UL — HIGH (ref 3.8–10.5)
WBC # BLD: 14.2 K/UL — HIGH (ref 3.8–10.5)
WBC # FLD AUTO: 14 K/UL — HIGH (ref 3.8–10.5)
WBC # FLD AUTO: 14.2 K/UL — HIGH (ref 3.8–10.5)

## 2018-05-17 PROCEDURE — 99233 SBSQ HOSP IP/OBS HIGH 50: CPT

## 2018-05-17 PROCEDURE — 99223 1ST HOSP IP/OBS HIGH 75: CPT

## 2018-05-17 PROCEDURE — 99223 1ST HOSP IP/OBS HIGH 75: CPT | Mod: GC

## 2018-05-17 PROCEDURE — 71045 X-RAY EXAM CHEST 1 VIEW: CPT | Mod: 26

## 2018-05-17 PROCEDURE — 99497 ADVNCD CARE PLAN 30 MIN: CPT | Mod: 25

## 2018-05-17 RX ORDER — PIPERACILLIN AND TAZOBACTAM 4; .5 G/20ML; G/20ML
3.38 INJECTION, POWDER, LYOPHILIZED, FOR SOLUTION INTRAVENOUS EVERY 8 HOURS
Qty: 0 | Refills: 0 | Status: DISCONTINUED | OUTPATIENT
Start: 2018-05-17 | End: 2018-05-21

## 2018-05-17 RX ORDER — METOPROLOL TARTRATE 50 MG
1 TABLET ORAL
Qty: 0 | Refills: 0 | COMMUNITY

## 2018-05-17 RX ORDER — IPRATROPIUM/ALBUTEROL SULFATE 18-103MCG
3 AEROSOL WITH ADAPTER (GRAM) INHALATION ONCE
Qty: 0 | Refills: 0 | Status: COMPLETED | OUTPATIENT
Start: 2018-05-17 | End: 2018-05-17

## 2018-05-17 RX ORDER — SODIUM CHLORIDE 9 MG/ML
3 INJECTION INTRAMUSCULAR; INTRAVENOUS; SUBCUTANEOUS ONCE
Qty: 0 | Refills: 0 | Status: COMPLETED | OUTPATIENT
Start: 2018-05-17 | End: 2018-05-17

## 2018-05-17 RX ORDER — HEPARIN SODIUM 5000 [USP'U]/ML
5000 INJECTION INTRAVENOUS; SUBCUTANEOUS EVERY 8 HOURS
Qty: 0 | Refills: 0 | Status: DISCONTINUED | OUTPATIENT
Start: 2018-05-17 | End: 2018-05-21

## 2018-05-17 RX ORDER — SERTRALINE 25 MG/1
50 TABLET, FILM COATED ORAL DAILY
Qty: 0 | Refills: 0 | Status: DISCONTINUED | OUTPATIENT
Start: 2018-05-17 | End: 2018-05-21

## 2018-05-17 RX ORDER — HYDROMORPHONE HYDROCHLORIDE 2 MG/ML
0.5 INJECTION INTRAMUSCULAR; INTRAVENOUS; SUBCUTANEOUS ONCE
Qty: 0 | Refills: 0 | Status: DISCONTINUED | OUTPATIENT
Start: 2018-05-17 | End: 2018-05-17

## 2018-05-17 RX ORDER — HEPARIN SODIUM 5000 [USP'U]/ML
INJECTION INTRAVENOUS; SUBCUTANEOUS
Qty: 25000 | Refills: 0 | Status: DISCONTINUED | OUTPATIENT
Start: 2018-05-17 | End: 2018-05-17

## 2018-05-17 RX ORDER — ALBUTEROL 90 UG/1
2.5 AEROSOL, METERED ORAL EVERY 6 HOURS
Qty: 0 | Refills: 0 | Status: DISCONTINUED | OUTPATIENT
Start: 2018-05-17 | End: 2018-05-19

## 2018-05-17 RX ORDER — METOPROLOL TARTRATE 50 MG
25 TABLET ORAL AT BEDTIME
Qty: 0 | Refills: 0 | Status: DISCONTINUED | OUTPATIENT
Start: 2018-05-17 | End: 2018-05-21

## 2018-05-17 RX ORDER — ASPIRIN/CALCIUM CARB/MAGNESIUM 324 MG
81 TABLET ORAL DAILY
Qty: 0 | Refills: 0 | Status: DISCONTINUED | OUTPATIENT
Start: 2018-05-17 | End: 2018-05-21

## 2018-05-17 RX ORDER — POTASSIUM CHLORIDE 20 MEQ
10 PACKET (EA) ORAL ONCE
Qty: 0 | Refills: 0 | Status: COMPLETED | OUTPATIENT
Start: 2018-05-17 | End: 2018-05-17

## 2018-05-17 RX ORDER — ALBUTEROL 90 UG/1
1 AEROSOL, METERED ORAL EVERY 4 HOURS
Qty: 0 | Refills: 0 | Status: DISCONTINUED | OUTPATIENT
Start: 2018-05-17 | End: 2018-05-19

## 2018-05-17 RX ORDER — SERTRALINE 25 MG/1
1 TABLET, FILM COATED ORAL
Qty: 0 | Refills: 0 | COMMUNITY

## 2018-05-17 RX ORDER — VANCOMYCIN HCL 1 G
1000 VIAL (EA) INTRAVENOUS ONCE
Qty: 0 | Refills: 0 | Status: COMPLETED | OUTPATIENT
Start: 2018-05-17 | End: 2018-05-17

## 2018-05-17 RX ORDER — METOPROLOL TARTRATE 50 MG
50 TABLET ORAL
Qty: 0 | Refills: 0 | Status: DISCONTINUED | OUTPATIENT
Start: 2018-05-18 | End: 2018-05-21

## 2018-05-17 RX ORDER — HEPARIN SODIUM 5000 [USP'U]/ML
2900 INJECTION INTRAVENOUS; SUBCUTANEOUS EVERY 6 HOURS
Qty: 0 | Refills: 0 | Status: DISCONTINUED | OUTPATIENT
Start: 2018-05-17 | End: 2018-05-17

## 2018-05-17 RX ADMIN — HYDROMORPHONE HYDROCHLORIDE 0.5 MILLIGRAM(S): 2 INJECTION INTRAMUSCULAR; INTRAVENOUS; SUBCUTANEOUS at 02:16

## 2018-05-17 RX ADMIN — PIPERACILLIN AND TAZOBACTAM 25 GRAM(S): 4; .5 INJECTION, POWDER, LYOPHILIZED, FOR SOLUTION INTRAVENOUS at 21:05

## 2018-05-17 RX ADMIN — SERTRALINE 50 MILLIGRAM(S): 25 TABLET, FILM COATED ORAL at 16:34

## 2018-05-17 RX ADMIN — Medication 81 MILLIGRAM(S): at 11:53

## 2018-05-17 RX ADMIN — HEPARIN SODIUM 600 UNIT(S)/HR: 5000 INJECTION INTRAVENOUS; SUBCUTANEOUS at 00:09

## 2018-05-17 RX ADMIN — HEPARIN SODIUM 5000 UNIT(S): 5000 INJECTION INTRAVENOUS; SUBCUTANEOUS at 21:05

## 2018-05-17 RX ADMIN — PIPERACILLIN AND TAZOBACTAM 25 GRAM(S): 4; .5 INJECTION, POWDER, LYOPHILIZED, FOR SOLUTION INTRAVENOUS at 06:25

## 2018-05-17 RX ADMIN — ALBUTEROL 2.5 MILLIGRAM(S): 90 AEROSOL, METERED ORAL at 17:22

## 2018-05-17 RX ADMIN — SODIUM CHLORIDE 3 MILLILITER(S): 9 INJECTION INTRAMUSCULAR; INTRAVENOUS; SUBCUTANEOUS at 00:11

## 2018-05-17 RX ADMIN — HEPARIN SODIUM 5000 UNIT(S): 5000 INJECTION INTRAVENOUS; SUBCUTANEOUS at 06:24

## 2018-05-17 RX ADMIN — HEPARIN SODIUM 5000 UNIT(S): 5000 INJECTION INTRAVENOUS; SUBCUTANEOUS at 13:21

## 2018-05-17 RX ADMIN — PIPERACILLIN AND TAZOBACTAM 25 GRAM(S): 4; .5 INJECTION, POWDER, LYOPHILIZED, FOR SOLUTION INTRAVENOUS at 13:21

## 2018-05-17 RX ADMIN — Medication 100 MILLIEQUIVALENT(S): at 06:25

## 2018-05-17 RX ADMIN — Medication 25 MILLIGRAM(S): at 21:05

## 2018-05-17 RX ADMIN — Medication 250 MILLIGRAM(S): at 07:00

## 2018-05-17 RX ADMIN — Medication 3 MILLILITER(S): at 01:45

## 2018-05-17 NOTE — PROGRESS NOTE ADULT - PROBLEM SELECTOR PLAN 2
- suspect aspiration PNA- per daughter, pt does have coughing and clearance after  liquids/food; Speech and swallow ordered, but family would like to defer any additional testing such as MDS  c/w Zosyn  - F/u BCx. Monitor leukocytosis - enterovirs infection with likely superimposed aspiration PNA- per daughter, pt does have coughing and clearance after  liquids/food; Speech and swallow ordered, but family would like to defer any additional testing such as MDS  c/w Zosyn  - F/u BCx. Monitor leukocytosis

## 2018-05-17 NOTE — ED ADULT NURSE REASSESSMENT NOTE - NS ED NURSE REASSESS COMMENT FT1
Patient becoming agitated and pulling at nebulizer mask - SpO2 99% with neb treatment. Patient not allowing to place on nasal cannula and keeps pulling it off, SpO2 82% on RA. Patient placed on blow-by O2, 15LPM with improvement to 88%. Patient keeps repeating "leave me alone," "take this off my finger." MD Iglesias aware, ordered 0.5mg dilaudid for agitation. Patient received bed assignment, report given to RN on floor, aware that patient is becoming more agitated and pulling at lines and tubes. Will continue to monitor until transport and attempt to place patient back on NC.

## 2018-05-17 NOTE — CONSULT NOTE ADULT - SUBJECTIVE AND OBJECTIVE BOX
Patient seen and evaluated at bedside in ED [facility] 2 with 2 daughters (Maureen Anna [phone] and Brianne Williamson [phone]) at bedside    Note the patient is currently AOx1, history obtained from chart and daughters at bedside    The patient does not have an outpatient Cardiologist    Chief Complaint: She was brought to the OSH ED because her breathing wasn't right x 3d    HPI: 89F w/ PMHx of Dementia (AOx1 @ baseline), Severe AS and HTN who was brought by her daughters to OSH ED because her breathing wasn't right x 3d. The patient stopped eating and drinking for the past 3d and became incoherent. At baseline she can barely convey her needs. Her daughter's didn't like the gurgling sound she was making and thought she was dehydrated so they brought her to the ED. They also note that she felt warm (although the report she did not have a fever). She coughs after eating.    OSH ED Course: NS x 2L, Albuterol Neb, CXR, CTX, Azithro, , Heparin IVP then gtt, Morphine 4mg. She was transferred to [facility] ED from OSH ED because her Trop I was 0.859 w/ BNP:34,853, Cr:1.41, WBC:14.3, Lactate:4.1 and a CXR showing a LLL PNA. Her ECG from the OSH showed a 1mm JOSELUIS in AVR. The patient never complained of CP at the OSH  PMHx:   Dementia (AOx1 @ baseline)  Depression  HTN  Severe AS (see TTE report below)    PSHx: None    Allergies: No Known Drug Allergies    Home Meds: Lopressor 50 in the morning and 25 in the evening, Zoloft 50    Current Medications:   acetaminophen   Tablet 650 milliGRAM(s) Oral once  heparin  Infusion.  Unit(s)/Hr IV Continuous <Continuous>  heparin  Injectable 2900 Unit(s) IV Push every 6 hours PRN    FAMILY HISTORY: Her father  at the age of 59 from an MI and her brother also  at the age of 59 from an MI    Social History: The patient lives alone, has a 24/7 HHA, walks w/ a walker or 1 person assist, , has 2 daughters (contact info above)  Smoking History: Former smoker, quit 20 yrs ago, smoked for 20 years  Alcohol Use: Denied  Drug Use: Denied    REVIEW OF SYSTEMS: Unable to obtain at the patient is AOx1 - she denied CP to me    Physical Exam:  T(F): 98.1 (05-16), Max: 99 (05-16)  HR: 110 (05-16) (108 - 122)  BP: 125/60 (05-16) (106/53 - 147/73)  RR: 22 (05-16)  SpO2: 95% on RA    GENERAL: The patient was AOx1 (name), breathing comfortably on RA in NA w/ overt upper airway secretions evident  HEAD:  Atraumatic, Normocephalic  ENT: EOMI, PERRLA, purulence surrounding both eyes, Neck supple, No JVD, moist mucosa  CHEST/LUNG: Coarse breath sounds diffusely rhonchi in the L lower lung filed  BACK: No spinal tenderness  HEART: Regular rate and rhythm; could not appreciate AS murmur (loud lung sounds obscuring)  ABDOMEN: Soft, Nontender, Nondistended; Bowel sounds present  EXTREMITIES:  No clubbing, cyanosis, 1+ b/l LE edema  NEUROLOGY: AAOx1, non-focal, cranial nerves intact  SKIN: Normal color, No rashes or lesions  LINES: PIV    Cardiovascular Diagnostic Testing:    ECG: Personally reviewed: from this ED: Sinus tachy @ 108, nl axis, nl intervals (other than QTc:495), no elevation in avr, sub 1mm STD in V3-V6    Echo: Personally reviewed: < from: Transthoracic Echocardiogram (17 @ 09:47) >  Dimensions:    Normal Values:  LA:     3.9    2.0 - 4.0 cm  Ao:     2.6    2.0 - 3.8 cm  SEPTUM: 1.0    0.6 - 1.2 cm  PWT: 1.0    0.6 - 1.1 cm  LVIDd:  4.9    3.0 - 5.6 cm  LVIDs:  3.1    1.8 - 4.0 cm  Derived variables:  LVMI: 117 g/m2  RWT: 0.40  Fractional short: 37 %  EF (Teicholtz): 66 %  Doppler Peak Velocity (m/sec): AoV=4.3  ------------------------------------------------------------------------  Observations: Mitral Valve: Mitral annular calcification and calcified mitral leaflets with decreased diastolic opening. Moderate mitral regurgitation. Mean transmitral valve gradient equals 6 mm Hg, estimated mitral valve area equals 2 sqcm (by pressure half time equation), consistent with mild mitral stenosis. Aortic Valve/Aorta: Calcified trileaflet aortic valve with decreased opening. Peak transaortic valve gradient equals 74 mm Hg, mean transaortic valve gradient equals 47 mm Hg, estimated aortic valve area equals 0.7 sqcm (by continuity equation), aortic valve velocity time integral equals 98 cm, consistent with severe aortic stenosis. Moderate aortic regurgitation. Peak left ventricular outflow tract gradient equals 4 mm Hg, mean gradient is equal to 2 mm Hg, LVOT velocity time integral equals 26 cm. Aortic Root: 2.6 cm. LVOT diameter: 1.8 cm. Left Atrium: Moderately dilated left atrium.  LA volume index = 43 cc/m2. Left Ventricle: Hyperdynamic left ventricle. Normal left ventricular internal dimensions and wall thicknesses. Right Heart: Normal right atrium. Normal right ventricular size and function. Normal tricuspid valve. Moderate tricuspid regurgitation. Normal pulmonic valve. Pericardium/Pleura: Normal pericardium with no pericardial effusion. Hemodynamic: Estimated right atrial pressure is 8 mm Hg. Estimated right ventricular systolic pressure equals 63 mm Hg, assuming right atrial pressure equals 8 mm Hg, consistent with severe pulmonary hypertension.  < end of copied text >    Imaging:    CXR: Personally reviewed: LLL PNA    Labs: Personally reviewed                        12.4   14.3  )-----------( 233      ( 16 May 2018 19:41 ) 88% Neutrophils, no bands             38.2     05-16    134<L>  |  101  |  49<H>  ----------------------------<  236<H>  3.5   |  20<L>  |  1.41<H>    Ca    9.0      16 May 2018 19:41    TPro  9.3<H>  /  Alb  2.3<L>  /  TBili  0.9  /  DBili  x   /  AST  34  /  ALT  19  /  AlkPhos  103  05-16    CARDIAC MARKERS ( 16 May 2018 19:41 )  0.859 ng/mL / x     / x     / x     / x        Serum Pro-Brain Natriuretic Peptide: [id number] pg/mL ( @ 19:41)    Lactate: 4.1 --> 2.5

## 2018-05-17 NOTE — H&P ADULT - ASSESSMENT
89F w/ PMHx of Dementia (AOx1 @ baseline), Severe AS and HTN who was brought by her daughters to OSH ED because her breathing wasn't right x 3d. Subsequently transferred to [facility] for further care as there was concern for STEMI. However, clinical hx and EKG findings more c/w NSTEMI / demand ischemia from PNA/sepsis.

## 2018-05-17 NOTE — CONSULT NOTE ADULT - ASSESSMENT
89F w/ PMHx of Dementia (AOx1 @ baseline), Severe AS and HTN who was brought by her daughters to OSH ED for symptoms that were likely due to a PNA (CAP). The patient meets SIRS w/ leukocytosis and sinus tachycardia and had a clear source of infection based on clinical exam and CXR - sepsis 2/2 PNA (w/ elevated lactate - severe sepsis). The mild elevation in her Trop I is likely in the setting of severe sepsis. Her elevation in BNP is also likely in this setting. Clinically the patient is not having an acute coronary syndrome.     Plan:    1. Severe sepsis 2/2 PNA  -Care per medicine  -Ok to hold home B-blocker in this setting  -Given that she is coughing after eating I suspect aspiration may be playing a role    2. Type II MI  -Would not continue Heparin gtt  -Can start ASA 81 but would not use a second antiplatelet  -Would not start statin given overall clinical picture    3. Dementia  -I had a long conversation with both daughters at bedside. They would like the patient to be DNR/DNI, no pressors, no escalation of care. In this setting, if the patient were to develop ACS a LHC would not be aligned with the families wishes. Would include Palliative in her care in the morning.     MINDA Hook  Cardiology Fellow  (p): [phone]

## 2018-05-17 NOTE — H&P ADULT - PROBLEM SELECTOR PLAN 4
- Gurgling noted on exam. Also cough at home with eating c/w aspiration.  - Family dose not want invasive procedures. Will call family again in the am to confirm no PEG planning before order speech and swallow study.  - C/w lowest risk honey consistency dysphagia I diet.

## 2018-05-17 NOTE — H&P ADULT - PROBLEM SELECTOR PLAN 2
- New LLL consolidation noted CXR with clinical sx suggesting aspiration.  - Will cover with broad spectrum abx zosyn for now.   - F/u BCx. Monitor leukocytosis in the am.

## 2018-05-17 NOTE — CONSULT NOTE ADULT - PROBLEM SELECTOR RECOMMENDATION 3
Worse than baseline per daughter, possible 2/2 infection. Aspiration risk may improve as PNA is treated, vs worsening of underlying dementia, unable to discern at this point in her clinical course.

## 2018-05-17 NOTE — H&P ADULT - PROBLEM SELECTOR PLAN 7
- DNR/DNI ordered from ER and forms signed.  - Cardiology documented  conversation with both daughters at bedside. They would like the patient to have no pressors, no escalation of care. In this setting, if the patient were to develop ACS a LHC would not be aligned with the families wishes. Would include Palliative in her care in the morning.  Will call family back in am for further details, i.e. peg planning etc.

## 2018-05-17 NOTE — CONSULT NOTE ADULT - ATTENDING COMMENTS
89F w/ PMHx of Dementia, Severe AS and HTN presenting with sepsis secondary to likely PNU, and non-thrombotic troponin elevation (peak troponin T 0.1) NSTEMI with no plans for further intervention or imaging (as it will not ). Agree with above to pursue conservative management and treat infection. Resume BB when stable. Consider palliative consult given multiple comorbidities. Currently DNR/DNI. Please call with specific questions. 89F w/ PMHx of Dementia, Severe AS and HTN presenting with sepsis secondary to likely PNU, and non-thrombotic troponin elevation (peak troponin T 0.1) demand ischemia NSTEMI type II with no plans for further intervention. Agree with above to pursue conservative management and treat infection. Resume BB when stable. Multiple ECHO abnormalities from May 2017 and do not expect them to be significantly different. Consider palliative consult given multiple comorbidities. Currently DNR/DNI. Please call with specific questions.

## 2018-05-17 NOTE — H&P ADULT - PROBLEM SELECTOR PLAN 1
- Patient presented with NSTEMI likely demand ischemia from infection. Currently no CP reported.  - Trend CE at 6am. First set at midnight 0.1 trop T. If clinically change, will repeat EKG.  - Start asa 81 mg. Hold off on heparin gtt, statin and 2nd antiplatelet agent per Cardiology.  - Will consider adding metoprolol once patient stabilizes. Will need further med rec from family. (Call family again in the am)

## 2018-05-17 NOTE — CONSULT NOTE ADULT - PROBLEM SELECTOR RECOMMENDATION 9
c/w O2, may need higher levels of O2 if becomes hypoxic. Currently appears comfortable. No role for opioids at this point. Suspect aspiration PNA.

## 2018-05-17 NOTE — PROVIDER CONTACT NOTE (OTHER) - SITUATION
Notified provider pt O2 between 85-88% on room air. Pt refusing any source of oxygen supplementation despite multiple attempts.

## 2018-05-17 NOTE — ED ADULT NURSE REASSESSMENT NOTE - NS ED NURSE REASSESS COMMENT FT1
MAR at bedside for evaluation, aware of patient pulling at O2 tubes. Aware that patient is 88% on blow by O2, no additional interventions needed at this time. MAR at bedside for evaluation, aware of patient pulling at O2 tubes. Aware that patient is 88% on blow by O2, no additional interventions needed at this time and will consider BIPAP if work of breathing worsens.

## 2018-05-17 NOTE — CONSULT NOTE ADULT - PROBLEM SELECTOR RECOMMENDATION 5
Discussed advanced care planning. Confirmed DNR/DNI per cardiology's discussion yesterday; trial of IV fluids and abx as per current management. Brianne decided on no artificial nutrition, and maximizing ability to maintain pleasure feeds. MOLST completed. > 16 minutes (11:58AM to 12:15PM) spent on ACP discussion and MOLST completion. Discussed advanced care planning. Confirmed DNR/DNI per cardiology's discussion yesterday; trial of IV fluids and abx as per current management. Brianne decided on no artificial nutrition, and maximizing ability to maintain pleasure feeds. MOLST completed. > 16 minutes (11:58AM to 12:15PM) spent on ACP discussion and MOLST completion.    d/w Dr. Macias. Goals current clear, hospice referral made, symptoms managed. Will sign-off; please reconsult as needed.

## 2018-05-17 NOTE — ED ADULT NURSE NOTE - CHPI ED SYMPTOMS NEG
no decreased eating/drinking/no tingling/no numbness/no pain/no dizziness/no vomiting/no chills/no fever/no nausea

## 2018-05-17 NOTE — H&P ADULT - ATTENDING COMMENTS
Patient unable to provide history 2/2 medical condition , family unavailable at time of interview   In brief, 89F w/ PMHx of Dementia (AOx1 @ baseline), Severe AS and HTN , initially presented to [facility] for decline in function and respiratory distress  for three days, found to have elevated cardiac enzymes and transferred to [facility] for concern for STEMI , evaluated by cardiology on arrival who assessed no S-t segment elevation , more likely increased cardiac demand in setting of sepsis, on exam  patient hypoxemic in mild respiratory distress, gurgling , labs w/ leukocytosis , RVP + for enterovirus, CXR with left lower lobe opacity , ekg with s-t depressions in v4-5; will treat for viral pneumonia w/ superimposed bacterial pneumonia with vanc and zosyn , patient currently refusing oxygen supplementation , will tx w/ nebs and chest pt . Per extensive discussion between cardiology and family , family wishes to avoid aggressive invasive measures , declines cath , and would like to maintain a  DNR/DNI order for patient.

## 2018-05-17 NOTE — PROGRESS NOTE ADULT - PROBLEM SELECTOR PLAN 4
C/w lowest risk honey consistency dysphagia I diet.  S/S eval- daughters are HCP and are clear they do not want PEG for their mom C/w lowest risk honey consistency dysphagia I diet.  S/S eval- daughters are HCP and are clear they do not want PEG for their mom. They understand pt is high aspiration risk and that even the most consverative diet can predispose pt to aspiration, respiratory distress, PNA and even death. They are  content with trying the current diet, but if pt gives resistance or dislikes the diet, they would like to liberalize diet to provide quality for the pt

## 2018-05-17 NOTE — CONSULT NOTE ADULT - ASSESSMENT
89F with dementia (AAO x 1 at baseline), severe AS, HTN here for dyspnea, transferred to [facility] for concern for STEMI. After discussion with cardiology, her daughters, who are surrogates, decided on DNR/DNI, no pressors, no care escalation, and no heart cath if indicated. Admitted to medical floor for treatment of aspiration PNA. Palliative care called for GOC.

## 2018-05-17 NOTE — CONSULT NOTE ADULT - SUBJECTIVE AND OBJECTIVE BOX
HPI: 89F with dementia (AAO x 1 at baseline), severe AS, HTN here for dyspnea, transferred to [facility] for concern for STEMI. After discussion with cardiology, her daughters, who are surrogates, decided on DNR/DNI, no pressors, no care escalation, and no heart cath if indicated. Admitted to medical floor for treatment of aspiration PNA.    Lives at home with 24 hour HHA, daughters live within walking distance in [place]. .     PERTINENT PMH REVIEWED:  [X ] YES [ ] NO           SOCIAL HISTORY:   Significant other/partner:  [ ] YES  [X ] NO               Children:  [X ] YES  [ ] NO                   Orthodox/Spirituality: Anabaptism  Substance hx:  [ ] YES   [X ] NO                   Tobacco hx:  [X ] YES  (former) [ ] NO                       Alcohol hx: [ ] YES  [ X] NO         Home Opioid hx:  [ ] YES  [X ] NO per H&P  Living Situation: [X ] Home  [ ] Long term care  [ ] Rehab [ ] Other    FAMILY HISTORY:  FAMILY HISTORY:  No pertinent family history in first degree relatives    [X ] Family history non-contributory     BASELINE (I)ADLs (prior to admission):  [place]: [ ] total  [ ] moderate [X ] dependent    ADVANCE DIRECTIVES:    DNR [X ] YES [ ] NO                            [ ] Completed  MOLST  [X ] YES [ ] NO                      [X ] Completed  Health Care Proxy [ ] YES  [ ] NO   [ ] Completed  Living Will  [ ] YES [ ] NO             [X ] Surrogates: daughters: Brianne Fritz [phone] | Maureen Anna [phone]    ALLERGIES:   Allergies    mushrooms (Rash)  No Known Drug Allergies    Intolerances        MEDICATIONS:   MEDICATIONS  (STANDING):  aspirin  chewable 81 milliGRAM(s) Oral daily  heparin  Injectable 5000 Unit(s) SubCutaneous every 8 hours  piperacillin/tazobactam IVPB. 3.375 Gram(s) IV Intermittent every 8 hours    MEDICATIONS  (PRN):      PRESENT SYMPTOMS:  Source: [X ] Patient   [ ] Family   [X ] Team     Pain:                        [ X] No [ ] Yes             [ ] Mild [ ] Moderate [ ] Severe    Onset -  Location -  Duration -  Character -  Alleviating/Aggravating -  Radiation -  Timing -      Dyspnea:                [ X] No [ ] Yes             [ ] Mild [ ] Moderate [ ] Severe    Anxiety:                  [X ] No [ ] Yes             [ ] Mild [ ] Moderate [ ] Severe    Fatigue:                  [X ] No [ ] Yes             [ ] Mild [ ] Moderate [ ] Severe    Nausea:                  [ ] No [ ] Yes             [ ] Mild [ ] Moderate [ ] Severe    Loss of appetite:   [ ] No [ ] Yes             [ ] Mild [ ] Moderate [ ] Severe    Constipation:        [ ] No [ ] Yes             [ ] Mild [ ] Moderate [ ] Severe    Other Symptoms:  [ ] All other review of systems negative   [X ] Unable to obtain due to poor mentation     Karnofsky Performance Score/Palliative Performance Status Version 2: 50%    PHYSICAL EXAM:  Vital Signs Last 24 Hrs  T(C): 36.9 (17 May 2018 03:32), Max: 37.6 (17 May 2018 00:11)  T(F): 98.5 (17 May 2018 03:32), Max: 99.7 (17 May 2018 00:11)  HR: 89 (17 May 2018 10:15) (89 - 122)  BP: 116/56 (17 May 2018 04:38) (106/53 - 147/73)  BP(mean): --  RR: 22 (17 May 2018 04:38) (20 - 26)  SpO2: 95% (17 May 2018 10:15) (88% - 99%) I&O's Summary    16 May 2018 07:01  -  17 May 2018 07:00  --------------------------------------------------------  IN: 300 mL / OUT: 0 mL / NET: 300 mL        General:  [X ] Alert  [ ] Oriented x      [ ] Lethargic  [ ] Agitated   [ ] Cachexia   [ ] Unarousable  [ X] Verbal  [ ] Non-Verbal  [ ] Sedated    HEENT:  [X ] Normal   [ ] Dry mouth   [ ] ET Tube    [ ] Trach  [ ] Oral lesions    Lungs:   [ ] Clear [ ] Tachypnea  [X ] Audible excessive secretions   [X ] Rhonchi        [ ] Right [ ] Left [ ] Bilateral  [ ] Crackles        [ ] Right [ ] Left [ ] Bilateral  [ ] Wheezing     [ ] Right [ ] Left [ ] Bilateral    Cardiovascular:  [X ] Regular [ ] Irregular [ ] Tachycardia   [ ] Bradycardia  [ ] Murmur [ ] Other    Abdomen: [X ] Soft  [ ] Distended   [X ] +BS  [X ] Non tender [ ] Tender  [ ]PEG   [ ]OGT/ NGT   [ ] Ostomy   Last BM:       Genitourinary: [ ] Normal [ ] Incontinent   [ ] Oliguria/Anuria   [ ] Sahni   [X ] No sahni    Musculoskeletal:  [ ] Normal   [ ] Weakness  [ ] Bedbound/Wheelchair bound [X ] No significant edema    Neurological: [ ] No focal deficits  [X ] Cognitive impairment  [ ] Dysphagia [ ] Dysarthria [ ] Paresis [ ] Other     Skin: [ ] Normal   [ ] Pressure ulcer(s)     [ ] Rash   [ ] Other    LABS: reviewed    05-17    138  |  100  |  48<H>  ----------------------------<  172<H>  3.4<L>   |  21<L>  |  1.01    Ca    9.0      17 May 2018 06:16  Mg     2.4     05-17    TPro  7.9  /  Alb  2.5<L>  /  TBili  0.6  /  DBili  x   /  AST  23  /  ALT  13  /  AlkPhos  89  05-17    PTT - ( 17 May 2018 06:16 )  PTT:25.2 sec      Shock: [ ] Septic [ ] Cardiogenic [ ] Neurologic [ ] Hypovolemic  Vasopressors x 0  Inotrophs x 0    Protein Calorie Malnutrition: [ ] Mild [ ] Moderate [ ] Severe  Oral Intake: [ ] Unable/mouth care only [ ] Minimal [X ] Moderate [ ] Full Capability  Diet: [ ] NPO [ ] Tube feeds [ ] TPN [ ] Other     RADIOLOGY & ADDITIONAL STUDIES:  CXR:  left lower lobe consolidation compatible with pneumonia.    REFERRALS:   [ ] Chaplaincy  [ ] Hospice  [ ] Child Life  [ ] Social Work  [ ] Case management [ ] Holistic Therapy

## 2018-05-17 NOTE — ED PROVIDER NOTE - CARE PLAN
Principal Discharge DX:	PNA (pneumonia)  Secondary Diagnosis:	NSTEMI (non-ST elevated myocardial infarction)

## 2018-05-17 NOTE — H&P ADULT - NSHPPHYSICALEXAM_GEN_ALL_CORE
T(C): 36.9 (05-17-18 @ 03:32), Max: 37.6 (05-17-18 @ 00:11)  HR: 96 (05-17-18 @ 04:38) (91 - 122)  BP: 116/56 (05-17-18 @ 04:38) (106/53 - 147/73)  RR: 22 (05-17-18 @ 04:38) (20 - 26)  SpO2: 90% (05-17-18 @ 04:38) (88% - 99%)  Wt(kg): --  GENERAL: The patient was AOx1 (name), breathing comfortably on RA, w/ overt upper airway secretions evident  HEAD:  Atraumatic, Normocephalic  ENT: EOMI, PERRLA, purulence surrounding both eyes, Neck supple, No JVD, moist mucosa  CHEST/LUNG: Coarse breath sounds diffusely rhonchi in the L lower lung filed.  BACK: No spinal tenderness  HEART: Regular rate and rhythm; could not appreciate AS murmur (loud lung sounds obscuring)  ABDOMEN: Soft, Nontender, Nondistended; Bowel sounds present  EXTREMITIES:  No clubbing, cyanosis, 1+ b/l LE edema  NEUROLOGY: AAOx1, non-focal, cranial nerves intact  SKIN: Normal color, No rashes or lesions

## 2018-05-17 NOTE — ED PROVIDER NOTE - MEDICAL DECISION MAKING DETAILS
89F w/ sob weakness, septic 2/2 pna. s/p ceftriaxone azithro at OSH. repeat labs here, cards eval, will continue hep gtt at this time- possibility of nstemi vs demand ischemia. TBA.

## 2018-05-17 NOTE — CHART NOTE - NSCHARTNOTEFT_GEN_A_CORE
Called by RN for witnessed fall. Patient attempted to sit on bed but instead sat on floor with RNs assistance. No trauma. Pt stable at time of assessment. Attending Dr. Macias was notified and aware. 1:1 was initiated for patient safety.     Jayne Valladares PA-C

## 2018-05-17 NOTE — CONSULT NOTE ADULT - SUBJECTIVE AND OBJECTIVE BOX
Patient seen and evaluated at bedside in ED Gold 2 with 2 daughters (Maureen Anna [phone] and Brianne Williamson [phone]) at bedside    Note the patient is currently AOx1, history obtained from chart and daughters at bedside    The patient does not have an outpatient Cardiologist    Chief Complaint: She was brought to the OSH ED because her breathing wasn't right x 3d    HPI: 89F w/ PMHx of Dementia (AOx1 @ baseline), Severe AS and HTN who was brought by her daughters to OSH ED because her breathing wasn't right x 3d. The patient stopped eating and drinking for the past 3d and became incoherent. At baseline she can barely convey her needs. Her daughter's didn't like the gurgling sound she was making and thought she was dehydrated so they brought her to the ED. They also note that she felt warm (although the report she did not have a fever). She coughs after eating.    OSH ED Course: NS x 2L, Albuterol Neb, CXR, CTX, Azithro, , Heparin IVP then gtt, Morphine 4mg. She was transferred to [facility] ED from OSH ED because her Trop I was 0.859 w/ BNP:34,853, Cr:1.41, WBC:14.3, Lactate:4.1 and a CXR showing a LLL PNA. Her ECG from the OSH showed a 1mm JOSELUIS in AVR. The patient never complained of CP at the OSH  PMHx:   Dementia (AOx1 @ baseline)  Depression  HTN  Severe AS (see TTE report below)    PSHx: None    Allergies: No Known Drug Allergies    Home Meds: Lopressor 50 in the morning and 25 in the evening, Zoloft 50    Current Medications:   acetaminophen   Tablet 650 milliGRAM(s) Oral once  heparin  Infusion.  Unit(s)/Hr IV Continuous <Continuous>  heparin  Injectable 2900 Unit(s) IV Push every 6 hours PRN    FAMILY HISTORY: Her father  at the age of 59 from an MI and her brother also  at the age of 59 from an MI    Social History: The patient lives alone, has a 24/7 HHA, walks w/ a walker or 1 person assist, , has 2 daughters (contact info above)  Smoking History: Former smoker, quit 20 yrs ago, smoked for 20 years  Alcohol Use: Denied  Drug Use: Denied    REVIEW OF SYSTEMS: Unable to obtain at the patient is AOx1 - she denied CP to me    Physical Exam:  T(F): 98.1 (05-16), Max: 99 (05-16)  HR: 110 (05-16) (108 - 122)  BP: 125/60 (05-16) (106/53 - 147/73)  RR: 22 (05-16)  SpO2: 95% on RA    GENERAL: The patient was AOx1 (name), breathing comfortably on RA in NA w/ overt upper airway secretions evident  HEAD:  Atraumatic, Normocephalic  ENT: EOMI, PERRLA, purulence surrounding both eyes, Neck supple, No JVD, moist mucosa  CHEST/LUNG: Coarse breath sounds diffusely rhonchi in the L lower lung filed  BACK: No spinal tenderness  HEART: Regular rate and rhythm; could not appreciate AS murmur (loud lung sounds obscuring)  ABDOMEN: Soft, Nontender, Nondistended; Bowel sounds present  EXTREMITIES:  No clubbing, cyanosis, 1+ b/l LE edema  NEUROLOGY: AAOx1, non-focal, cranial nerves intact  SKIN: Normal color, No rashes or lesions  LINES: PIV    Cardiovascular Diagnostic Testing:    ECG: Personally reviewed: from this ED: Sinus tachy @ 108, nl axis, nl intervals (other than QTc:495), no elevation in avr, sub 1mm STD in V3-V6    Echo: Personally reviewed: < from: Transthoracic Echocardiogram (17 @ 09:47) >  Dimensions:    Normal Values:  LA:     3.9    2.0 - 4.0 cm  Ao:     2.6    2.0 - 3.8 cm  SEPTUM: 1.0    0.6 - 1.2 cm  PWT: 1.0    0.6 - 1.1 cm  LVIDd:  4.9    3.0 - 5.6 cm  LVIDs:  3.1    1.8 - 4.0 cm  Derived variables:  LVMI: 117 g/m2  RWT: 0.40  Fractional short: 37 %  EF (Teicholtz): 66 %  Doppler Peak Velocity (m/sec): AoV=4.3  ------------------------------------------------------------------------  Observations: Mitral Valve: Mitral annular calcification and calcified mitral leaflets with decreased diastolic opening. Moderate mitral regurgitation. Mean transmitral valve gradient equals 6 mm Hg, estimated mitral valve area equals 2 sqcm (by pressure half time equation), consistent with mild mitral stenosis. Aortic Valve/Aorta: Calcified trileaflet aortic valve with decreased opening. Peak transaortic valve gradient equals 74 mm Hg, mean transaortic valve gradient equals 47 mm Hg, estimated aortic valve area equals 0.7 sqcm (by continuity equation), aortic valve velocity time integral equals 98 cm, consistent with severe aortic stenosis. Moderate aortic regurgitation. Peak left ventricular outflow tract gradient equals 4 mm Hg, mean gradient is equal to 2 mm Hg, LVOT velocity time integral equals 26 cm. Aortic Root: 2.6 cm. LVOT diameter: 1.8 cm. Left Atrium: Moderately dilated left atrium.  LA volume index = 43 cc/m2. Left Ventricle: Hyperdynamic left ventricle. Normal left ventricular internal dimensions and wall thicknesses. Right Heart: Normal right atrium. Normal right ventricular size and function. Normal tricuspid valve. Moderate tricuspid regurgitation. Normal pulmonic valve. Pericardium/Pleura: Normal pericardium with no pericardial effusion. Hemodynamic: Estimated right atrial pressure is 8 mm Hg. Estimated right ventricular systolic pressure equals 63 mm Hg, assuming right atrial pressure equals 8 mm Hg, consistent with severe pulmonary hypertension.  < end of copied text >    Imaging:    CXR: Personally reviewed: LLL PNA    Labs: Personally reviewed                        12.4   14.3  )-----------( 233      ( 16 May 2018 19:41 ) 88% Neutrophils, no bands             38.2     05-16    134<L>  |  101  |  49<H>  ----------------------------<  236<H>  3.5   |  20<L>  |  1.41<H>    Ca    9.0      16 May 2018 19:41    TPro  9.3<H>  /  Alb  2.3<L>  /  TBili  0.9  /  DBili  x   /  AST  34  /  ALT  19  /  AlkPhos  103  05-16    CARDIAC MARKERS ( 16 May 2018 19:41 )  0.859 ng/mL / x     / x     / x     / x        Serum Pro-Brain Natriuretic Peptide: 01607 pg/mL ( @ 19:41)    Lactate: 4.1 --> 2.5

## 2018-05-17 NOTE — H&P ADULT - HISTORY OF PRESENT ILLNESS
89F w/ PMHx of Dementia (AOx1 @ baseline), Severe AS and HTN who was brought by her daughters to OSH ED because her breathing wasn't right x 3d. Subsequently transferred to [facility] for further care as there was concern for STEMI. Per chart review from OSH record, patient stopped eating and drinking for the past 3d and became incoherent. At baseline she can barely convey her needs. Her daughter's didn't like the gurgling sound she was making and thought she was dehydrated so they brought her to the ED. They also note that she felt warm (although the report she did not have a fever). She coughs after eating.    OSH ED Course: NS x 2L, Albuterol Neb, CXR, CTX, Azithro, , Heparin IVP then gtt, Morphine 4mg. She was transferred to [facility] ED from OSH ED because her Trop I was 0.859 w/ BNP:34,853, Cr:1.41, WBC:14.3, Lactate:4.1 and a CXR showing a LLL PNA. Her ECG from the OSH showed a 1mm JOSELUIS in AVR. The patient never complained of CP at the OSH. In the [facility] ER, patient was continued on heparin gtt and repeat Trop T remains elevated. Patient again has no complaint. Remains VSSAF, sating 85-low 90s on RA.

## 2018-05-17 NOTE — ED PROVIDER NOTE - OBJECTIVE STATEMENT
Elana Adams M.D: 89F hx htn dementia presenting as transfer from [place] for NSTEMI, on heparin gtt. p/w 3 days worsening SOB and weakness, with cough productive of sputum. no f/c no cp no nvcd, but family notes pt does not complain when things are wrong. at [facility] sepsis protocol initiated given ceftriaxoner and azithromycin, started on heparin gtt over concern for possible STEMI and transferred here. cardiology evaluated EKG prior to transfer and ntoed this is not STEMI. pt also did not get sepsis fluid protocol 2/2 pulm edema on cxr and concern for chf fluid overload. Elana Adams M.D: 89F hx htn dementia presenting as transfer from [place] for NSTEMI, on heparin gtt. p/w 3 days worsening SOB and weakness, with cough productive of sputum. no f/c no cp no nvcd, but family notes pt does not complain when things are wrong. at [facility] sepsis protocol initiated given ceftriaxoner and azithromycin, started on heparin gtt over concern for possible STEMI and transferred here. cardiology evaluated EKG prior to transfer and noted this is not STEMI. pt also did not get sepsis fluid protocol 2/2 pulm edema on cxr and concern for chf fluid overload.

## 2018-05-17 NOTE — ED PROVIDER NOTE - PHYSICAL EXAMINATION
Elana Adams M.D.:   patient awake alert seen lying on stretcher NAD .   LUNGS Coarse b/l w/ crackles and rhonchi.   CARD tachycardic no m/r/g.    Abdomen soft NT ND no rebound no guarding no CVA tenderness.   EXT WWP b/l LE edema no calf tenderness CV 2+DP/PT bilaterally.   neuro A&Ox1-2 (person place)moving all extremities.    skin warm and dry no rash  HEENT: dry mucous membranes, PERRL, EOMI

## 2018-05-17 NOTE — H&P ADULT - NSHPLABSRESULTS_GEN_ALL_CORE
(05-17 @ 00:04)                      11.4  14.2 )-----------( 221                 35.5    Neutrophils = 12.0 (75.0%)  Lymphocytes = 0.6 (13.0%)  Eosinophils = 0.0 (--%)  Basophils = 0.0 (--%)  Monocytes = 0.5 (10.0%)  Bands = 2%    05-17    136  |  100  |  48<H>  ----------------------------<  207<H>  3.2<L>   |  22  |  1.07    Ca    8.6      17 May 2018 00:04    TPro  8.3  /  Alb  2.7<L>  /  TBili  0.7  /  DBili  x   /  AST  29  /  ALT  14  /  AlkPhos  86  05-17      CARDIAC MARKERS ( 17 May 2018 00:04 )  Trop 0.10 ng/mL<H> / CK x     / CKMB x           RVP:(05-17 @ 00:10)  Detected      Venous Blood Gas:  05-17 @ 00:04  7.35/44/48/23/74  VBG Lactate: 2.9    CXR: LLL consolidation  EKG: STD in lateral precordial leads. No JOSELUIS appreciated. NSR, .

## 2018-05-17 NOTE — PROGRESS NOTE ADULT - PROBLEM SELECTOR PLAN 1
- Patient presented with NSTEMI likely demand ischemia from infection. Currently no CP reported currently  c/w ASA - Patient presented with NSTEMI likely demand ischemia from infection. Currently no CP reported currently  c/w ASA  c/w beta blocker

## 2018-05-17 NOTE — H&P ADULT - PROBLEM SELECTOR PLAN 5
- Patient AOX1 at the time of exam. Per chart review, this is her baseline, likely chronic. However, acute infection from PNA can precipitate this encephalopathy.  - Treat PNA as illustrated above.  - Continuously reorient patient. Patient currently in bed next to window. Encourage family accompany in the am.

## 2018-05-17 NOTE — ED PROVIDER NOTE - ATTENDING CONTRIBUTION TO CARE
Patient transferred from osh for reported + stemi with santino aVR and st depression V3,4,5; this was not seen on repeat ekg and depressions were noted on prior ekgs. Patient is DNR/DNI per family and there is no desire to pursue cardiac intervention. Patient given antibiotics azithro/ceftriaxone at osh. Patient on heparin GTT and + ce at osh as well  patient ill appearing, demented and ros limited to this, awake, answers some questions, rhonchorous breath sounds throughout lungs, breathing 25-30bpm, following some commands, abdomen mildly distended but soft, no rebound/guarding, trace to + 1 lower extremity edema, no rashes  will get iv, labs, cont heparin gtt, cxr, admit and have palliative consult Patient transferred from osh for reported + stemi with santino aVR and st depression V3,4,5; this was not seen on repeat ekg and depressions were noted on prior ekgs. Patient is DNR/DNI per family and there is no desire to pursue cardiac intervention. Patient given antibiotics azithro/ceftriaxone at osh. Patient on heparin GTT and + ce at osh as well  patient ill appearing, demented and ros limited to this, awake, answers some questions, rhonchorous breath sounds throughout lungs, breathing 25-30bpm, following some commands, abdomen mildly distended but soft, no rebound/guarding, trace to + 1 lower extremity edema, no rashes  will get iv, labs, cont heparin gtt, cxr, admit and have palliative consult  DNR/DNI confirmed with family with cardiology at bedside  will continue to treat pneumonia, give oxygen prn, treat hypoxia with prn low dosage opioid and oxygen   palliative consult placed   will admit

## 2018-05-17 NOTE — ED ADULT NURSE NOTE - OBJECTIVE STATEMENT
89 yo F presents via EMS on a transfer from [facility], heparin drip running at 600 units/hr on pump. Pt went to [facility] w/ complaint of worsening SOB over past few days. Pt has audible wheezes and crackles, on 4 LPM oxygen via nasal canula. At [facility] pt was treated for possible sepsis and possible NSTEMI w/ 2 L normal saline, 2900 unit bolus heparin, 600 units/hr drip heparin, 325 mg ASA, 4 mg morphine, 500 mg zithromax, and 1 g rocephin. At [facility] lactate initially 4.1, dropped to 2.5. Pt A&Ox1 to self, not complaining of CP, N/V. Family at bedside. 88 yo F presents via EMS on a transfer from [facility], heparin drip running at 600 units/hr on pump. Pt went to [facility] w/ complaint of worsening SOB over past few days. Pt has audible wheezes and crackles, on 4 LPM oxygen via nasal canula. Pt intermittently agitated, pulling off oxygen. At [facility] pt was treated for possible sepsis and possible NSTEMI w/ 2 L normal saline, 2900 unit bolus heparin, 600 units/hr drip heparin, 325 mg ASA, 4 mg morphine, 500 mg zithromax, and 1 g rocephin. At [facility] lactate initially 4.1, dropped to 2.5. Pt A&Ox1 to self, not complaining of CP, N/V. Family at bedside.

## 2018-05-17 NOTE — H&P ADULT - PROBLEM SELECTOR PLAN 3
- Likely secondary to PNA. Currently only sating 85% on RA. Patient refuses all oxygen supplementation.  - No labored breathing or resp distress noted on exam at the time of interview.  - Consider giving patient non-rebreather mask at low volume for comfort? Hold off on bipap as patient high risk for aspiration.

## 2018-05-18 DIAGNOSIS — R69 ILLNESS, UNSPECIFIED: ICD-10-CM

## 2018-05-18 LAB
ANION GAP SERPL CALC-SCNC: 15 MMOL/L — SIGNIFICANT CHANGE UP (ref 5–17)
BUN SERPL-MCNC: 42 MG/DL — HIGH (ref 7–23)
CALCIUM SERPL-MCNC: 9.1 MG/DL — SIGNIFICANT CHANGE UP (ref 8.4–10.5)
CHLORIDE SERPL-SCNC: 104 MMOL/L — SIGNIFICANT CHANGE UP (ref 96–108)
CO2 SERPL-SCNC: 21 MMOL/L — LOW (ref 22–31)
CREAT SERPL-MCNC: 0.81 MG/DL — SIGNIFICANT CHANGE UP (ref 0.5–1.3)
GLUCOSE SERPL-MCNC: 123 MG/DL — HIGH (ref 70–99)
HCT VFR BLD CALC: 33.2 % — LOW (ref 34.5–45)
HGB BLD-MCNC: 10.3 G/DL — LOW (ref 11.5–15.5)
MCHC RBC-ENTMCNC: 27.6 PG — SIGNIFICANT CHANGE UP (ref 27–34)
MCHC RBC-ENTMCNC: 31 GM/DL — LOW (ref 32–36)
MCV RBC AUTO: 89 FL — SIGNIFICANT CHANGE UP (ref 80–100)
PLATELET # BLD AUTO: 244 K/UL — SIGNIFICANT CHANGE UP (ref 150–400)
POTASSIUM SERPL-MCNC: 3.6 MMOL/L — SIGNIFICANT CHANGE UP (ref 3.5–5.3)
POTASSIUM SERPL-SCNC: 3.6 MMOL/L — SIGNIFICANT CHANGE UP (ref 3.5–5.3)
RBC # BLD: 3.73 M/UL — LOW (ref 3.8–5.2)
RBC # FLD: 15.6 % — HIGH (ref 10.3–14.5)
SODIUM SERPL-SCNC: 140 MMOL/L — SIGNIFICANT CHANGE UP (ref 135–145)
WBC # BLD: 11.94 K/UL — HIGH (ref 3.8–10.5)
WBC # FLD AUTO: 11.94 K/UL — HIGH (ref 3.8–10.5)

## 2018-05-18 PROCEDURE — 99233 SBSQ HOSP IP/OBS HIGH 50: CPT

## 2018-05-18 RX ADMIN — HEPARIN SODIUM 5000 UNIT(S): 5000 INJECTION INTRAVENOUS; SUBCUTANEOUS at 13:20

## 2018-05-18 RX ADMIN — Medication 81 MILLIGRAM(S): at 10:17

## 2018-05-18 RX ADMIN — HEPARIN SODIUM 5000 UNIT(S): 5000 INJECTION INTRAVENOUS; SUBCUTANEOUS at 22:09

## 2018-05-18 RX ADMIN — PIPERACILLIN AND TAZOBACTAM 25 GRAM(S): 4; .5 INJECTION, POWDER, LYOPHILIZED, FOR SOLUTION INTRAVENOUS at 22:09

## 2018-05-18 RX ADMIN — Medication 50 MILLIGRAM(S): at 06:12

## 2018-05-18 RX ADMIN — ALBUTEROL 2.5 MILLIGRAM(S): 90 AEROSOL, METERED ORAL at 10:18

## 2018-05-18 RX ADMIN — PIPERACILLIN AND TAZOBACTAM 25 GRAM(S): 4; .5 INJECTION, POWDER, LYOPHILIZED, FOR SOLUTION INTRAVENOUS at 06:12

## 2018-05-18 RX ADMIN — HEPARIN SODIUM 5000 UNIT(S): 5000 INJECTION INTRAVENOUS; SUBCUTANEOUS at 06:12

## 2018-05-18 RX ADMIN — PIPERACILLIN AND TAZOBACTAM 25 GRAM(S): 4; .5 INJECTION, POWDER, LYOPHILIZED, FOR SOLUTION INTRAVENOUS at 13:19

## 2018-05-18 RX ADMIN — ALBUTEROL 2.5 MILLIGRAM(S): 90 AEROSOL, METERED ORAL at 23:39

## 2018-05-18 RX ADMIN — Medication 25 MILLIGRAM(S): at 22:09

## 2018-05-18 RX ADMIN — ALBUTEROL 2.5 MILLIGRAM(S): 90 AEROSOL, METERED ORAL at 18:30

## 2018-05-18 RX ADMIN — SERTRALINE 50 MILLIGRAM(S): 25 TABLET, FILM COATED ORAL at 10:17

## 2018-05-18 RX ADMIN — ALBUTEROL 2.5 MILLIGRAM(S): 90 AEROSOL, METERED ORAL at 06:12

## 2018-05-18 NOTE — PROGRESS NOTE ADULT - PROBLEM SELECTOR PLAN 3
tachypnea resolved- c/w nebulizers ATC  Chest percussion therapy ordered to help mobilize secretions

## 2018-05-18 NOTE — CHART NOTE - NSCHARTNOTEFT_GEN_A_CORE
Medicine NP Episodic Note    Constant observation maintained for safety 2/2 confused state and frequent attempts made by pt. to climb OOB which places pt. at risk for falls/injury.     Rosemarie Berg, [facility]  [phone]

## 2018-05-18 NOTE — PROGRESS NOTE ADULT - PROBLEM SELECTOR PLAN 1
- Patient presented with NSTEMI likely demand ischemia from infection. Currently no CP reported currently  c/w ASA  c/w beta blocker

## 2018-05-18 NOTE — PROGRESS NOTE ADULT - PROBLEM SELECTOR PLAN 4
C/w conservative diet: honey consistency dysphagia I diet.  S/S eval- daughters are HCP and are clear they do not want PEG for their mom. They understand pt is high aspiration risk and that even the most conservative diet can predispose pt to aspiration, respiratory distress, PNA and even death. They are  content with trying the current diet, but if pt gives resistance or dislikes the diet, they would like to liberalize diet to provide quality for the pt

## 2018-05-18 NOTE — PROGRESS NOTE ADULT - PROBLEM SELECTOR PLAN 2
- enterovirus infection with likely superimposed aspiration PNA- per daughter, pt does have coughing and clearance after  liquids/food; Speech and swallow ordered, but family would like to defer any additional testing such as MDS  c/w Zosyn; blood cx's are negative  leukocytosis improving

## 2018-05-19 DIAGNOSIS — E87.6 HYPOKALEMIA: ICD-10-CM

## 2018-05-19 DIAGNOSIS — I10 ESSENTIAL (PRIMARY) HYPERTENSION: ICD-10-CM

## 2018-05-19 DIAGNOSIS — I35.0 NONRHEUMATIC AORTIC (VALVE) STENOSIS: ICD-10-CM

## 2018-05-19 LAB
ANION GAP SERPL CALC-SCNC: 10 MMOL/L — SIGNIFICANT CHANGE UP (ref 5–17)
ANION GAP SERPL CALC-SCNC: 11 MMOL/L — SIGNIFICANT CHANGE UP (ref 5–17)
BUN SERPL-MCNC: 27 MG/DL — HIGH (ref 7–23)
BUN SERPL-MCNC: 29 MG/DL — HIGH (ref 7–23)
CALCIUM SERPL-MCNC: 8.9 MG/DL — SIGNIFICANT CHANGE UP (ref 8.4–10.5)
CALCIUM SERPL-MCNC: 9 MG/DL — SIGNIFICANT CHANGE UP (ref 8.4–10.5)
CHLORIDE SERPL-SCNC: 107 MMOL/L — SIGNIFICANT CHANGE UP (ref 96–108)
CHLORIDE SERPL-SCNC: 108 MMOL/L — SIGNIFICANT CHANGE UP (ref 96–108)
CO2 SERPL-SCNC: 25 MMOL/L — SIGNIFICANT CHANGE UP (ref 22–31)
CO2 SERPL-SCNC: 26 MMOL/L — SIGNIFICANT CHANGE UP (ref 22–31)
CREAT SERPL-MCNC: 0.71 MG/DL — SIGNIFICANT CHANGE UP (ref 0.5–1.3)
CREAT SERPL-MCNC: 0.73 MG/DL — SIGNIFICANT CHANGE UP (ref 0.5–1.3)
GLUCOSE SERPL-MCNC: 148 MG/DL — HIGH (ref 70–99)
GLUCOSE SERPL-MCNC: 161 MG/DL — HIGH (ref 70–99)
HCT VFR BLD CALC: 30.7 % — LOW (ref 34.5–45)
HGB BLD-MCNC: 9.9 G/DL — LOW (ref 11.5–15.5)
MCHC RBC-ENTMCNC: 27.8 PG — SIGNIFICANT CHANGE UP (ref 27–34)
MCHC RBC-ENTMCNC: 32.2 GM/DL — SIGNIFICANT CHANGE UP (ref 32–36)
MCV RBC AUTO: 86.2 FL — SIGNIFICANT CHANGE UP (ref 80–100)
PLATELET # BLD AUTO: 283 K/UL — SIGNIFICANT CHANGE UP (ref 150–400)
POTASSIUM SERPL-MCNC: 3.1 MMOL/L — LOW (ref 3.5–5.3)
POTASSIUM SERPL-MCNC: 3.7 MMOL/L — SIGNIFICANT CHANGE UP (ref 3.5–5.3)
POTASSIUM SERPL-SCNC: 3.1 MMOL/L — LOW (ref 3.5–5.3)
POTASSIUM SERPL-SCNC: 3.7 MMOL/L — SIGNIFICANT CHANGE UP (ref 3.5–5.3)
RBC # BLD: 3.56 M/UL — LOW (ref 3.8–5.2)
RBC # FLD: 16.2 % — HIGH (ref 10.3–14.5)
SODIUM SERPL-SCNC: 143 MMOL/L — SIGNIFICANT CHANGE UP (ref 135–145)
SODIUM SERPL-SCNC: 144 MMOL/L — SIGNIFICANT CHANGE UP (ref 135–145)
WBC # BLD: 11.68 K/UL — HIGH (ref 3.8–10.5)
WBC # FLD AUTO: 11.68 K/UL — HIGH (ref 3.8–10.5)

## 2018-05-19 PROCEDURE — 99233 SBSQ HOSP IP/OBS HIGH 50: CPT

## 2018-05-19 RX ORDER — POTASSIUM CHLORIDE 20 MEQ
10 PACKET (EA) ORAL ONCE
Qty: 0 | Refills: 0 | Status: DISCONTINUED | OUTPATIENT
Start: 2018-05-19 | End: 2018-05-19

## 2018-05-19 RX ORDER — IPRATROPIUM/ALBUTEROL SULFATE 18-103MCG
3 AEROSOL WITH ADAPTER (GRAM) INHALATION EVERY 6 HOURS
Qty: 0 | Refills: 0 | Status: DISCONTINUED | OUTPATIENT
Start: 2018-05-19 | End: 2018-05-21

## 2018-05-19 RX ORDER — POTASSIUM CHLORIDE 20 MEQ
10 PACKET (EA) ORAL
Qty: 0 | Refills: 0 | Status: COMPLETED | OUTPATIENT
Start: 2018-05-19 | End: 2018-05-19

## 2018-05-19 RX ADMIN — PIPERACILLIN AND TAZOBACTAM 25 GRAM(S): 4; .5 INJECTION, POWDER, LYOPHILIZED, FOR SOLUTION INTRAVENOUS at 14:04

## 2018-05-19 RX ADMIN — Medication 100 MILLIEQUIVALENT(S): at 11:59

## 2018-05-19 RX ADMIN — Medication 100 MILLIEQUIVALENT(S): at 08:58

## 2018-05-19 RX ADMIN — Medication 100 MILLIEQUIVALENT(S): at 11:00

## 2018-05-19 RX ADMIN — Medication 50 MILLIGRAM(S): at 06:09

## 2018-05-19 RX ADMIN — Medication 3 MILLILITER(S): at 17:31

## 2018-05-19 RX ADMIN — HEPARIN SODIUM 5000 UNIT(S): 5000 INJECTION INTRAVENOUS; SUBCUTANEOUS at 06:09

## 2018-05-19 RX ADMIN — Medication 3 MILLILITER(S): at 12:01

## 2018-05-19 RX ADMIN — SERTRALINE 50 MILLIGRAM(S): 25 TABLET, FILM COATED ORAL at 11:58

## 2018-05-19 RX ADMIN — Medication 81 MILLIGRAM(S): at 11:58

## 2018-05-19 RX ADMIN — Medication 3 MILLILITER(S): at 23:08

## 2018-05-19 RX ADMIN — PIPERACILLIN AND TAZOBACTAM 25 GRAM(S): 4; .5 INJECTION, POWDER, LYOPHILIZED, FOR SOLUTION INTRAVENOUS at 23:08

## 2018-05-19 RX ADMIN — PIPERACILLIN AND TAZOBACTAM 25 GRAM(S): 4; .5 INJECTION, POWDER, LYOPHILIZED, FOR SOLUTION INTRAVENOUS at 06:09

## 2018-05-19 RX ADMIN — ALBUTEROL 2.5 MILLIGRAM(S): 90 AEROSOL, METERED ORAL at 06:09

## 2018-05-19 RX ADMIN — HEPARIN SODIUM 5000 UNIT(S): 5000 INJECTION INTRAVENOUS; SUBCUTANEOUS at 23:08

## 2018-05-19 RX ADMIN — HEPARIN SODIUM 5000 UNIT(S): 5000 INJECTION INTRAVENOUS; SUBCUTANEOUS at 14:04

## 2018-05-19 RX ADMIN — Medication 25 MILLIGRAM(S): at 23:08

## 2018-05-19 NOTE — PROGRESS NOTE ADULT - PROBLEM SELECTOR PLAN 1
presented with NSTEMI likely demand ischemia from infection. Currently no CP  - c/w ASA, home metoprolol as per schedule  - seen by cardiology - recommended for palliative f/u

## 2018-05-19 NOTE — SWALLOW BEDSIDE ASSESSMENT ADULT - SWALLOW EVAL: DIAGNOSIS
Patient with baseline productive cough and intermittent wet breath sounds that confound assessment. Patient presents with suspected pharyngeal dysphagia characterized by reduced hyolaryngeal elevation upon palpation, intermittently delayed pharyngeal swallow trigger, and increased frequency in cough with thin liquids suggestive of laryngeal penetration and/or aspiration. Chewables not administered 2/2 fluctuating RR up to 30 as assessment progressed. Patient with baseline productive cough and intermittent wet breath sounds that confound assessment. Patient presents with suspected pharyngeal dysphagia characterized by reduced hyolaryngeal elevation upon palpation, intermittently delayed pharyngeal swallow trigger, and increased frequency in cough with thin liquids suggestive of laryngeal penetration and/or aspiration. Chewables not administered 2/2 fluctuating RR, up to 30 as assessment progressed.

## 2018-05-19 NOTE — SWALLOW BEDSIDE ASSESSMENT ADULT - CONSISTENCIES ADMINISTERED
puree thick/puree thin/thin liquid/honey thick/nectar thick chewables not administered 2/2 fluctuating RR, up to 30 via direct observation; RN made aware and in to assess

## 2018-05-19 NOTE — SWALLOW BEDSIDE ASSESSMENT ADULT - SWALLOW EVAL: RECOMMENDED DIET
Unable to make dietary recommendation given confounding baseline cough, however given increased s/s of laryngeal penetration and/or aspiration with thin liquids would recommend considering Dysphagia 1 with nectar thickened liquids if medical team continues oral diet in keeping with pt/family wishes.

## 2018-05-19 NOTE — PROGRESS NOTE ADULT - PROBLEM SELECTOR PLAN 6
moderate control  - c/w home metoprolol  - will be conservative with bp management given age and comorbidities as per TTE 5/2018  - palliative follow up, MOLST form filled, DNR/DNI  - seen by cards

## 2018-05-19 NOTE — SWALLOW BEDSIDE ASSESSMENT ADULT - SLP GENERAL OBSERVATIONS
Patient encountered supine in bed, positioned upright for purpose of evaluation. Pt A&O grossly x2, able to make basic wants and needs known and 1-step directives for purpose of evaluation. Memory deficits and reduced attention noted. Speech and language grossly WNL in conversation. Hoarse vocal quality noted. Pt required intermittent encouragement to continue to accept PO trials throughout assessment.

## 2018-05-19 NOTE — SWALLOW BEDSIDE ASSESSMENT ADULT - SWALLOW EVAL: PATIENT/FAMILY GOALS STATEMENT
Patient and daughter deny s/s oh laryngeal penetration and/or aspiration PTA. Deny h/o GERD, deny h/o PNA. Report coughing began ~3 days PTA and was consistent throughout the day. Report refusal to take PO for same period of time. Report pt's voice intermittently hoarse since coughing began. Daughter states pt "doesn't eat much at a time but she has an appetite, she eats small amounts but more often throughout the day." Daughter reports that they wish to liberalize pt's diet to thin liquids, states she understands and accepts the risks of aspiration, however she and her sister wish to pursue objective testing to formally assess and r/o aspiration.

## 2018-05-19 NOTE — SWALLOW BEDSIDE ASSESSMENT ADULT - SLP PERTINENT HISTORY OF CURRENT PROBLEM
89F w/ PMHx of Dementia (AOx1 @ baseline), Severe AS and HTN who was brought by her daughters to OSH ED because her breathing wasn't right x 3d. Subsequently transferred to [facility] for further care as there was concern for STEMI. Per chart review from OSH record, patient stopped eating and drinking for the past 3d and became incoherent. At baseline she can barely convey her needs. Her daughter's didn't like the gurgling sound she was making and thought she was dehydrated so they brought her to the ED. They also note that she felt warm (although the report she did not have a fever). She coughs after eating. Per Assessment there was concern for STEMI. However, clinical hx and EKG findings more c/w NSTEMI / demand ischemia from PNA/sepsis. Dx: 1) NSTEMI, 2) PNA; New LLL consolidation noted CXR with clinical sx suggesting aspiration. 3) Acute respiratory failure with hypoxia, likely 2/2 PNA.

## 2018-05-19 NOTE — SWALLOW BEDSIDE ASSESSMENT ADULT - SWALLOW EVAL: PROGNOSIS
Hx contd: Seen by Palliative, daughter is open to hospice. Confirmed DNR/DNI per cardiology's discussion yesterday; trial of IV fluids and abx as per current management. Brianne decided on no artificial nutrition, and maximizing ability to maintain pleasure feeds. Per Med “S/S eval- daughters are HCP and are clear they do not want PEG for their mom. They understand pt is high aspiration risk and that even the most consverative diet can predispose pt to aspiration, respiratory distress, PNA and even death. They are content with trying the current diet, but if pt gives resistance or dislikes the diet, they would like to liberalize diet to provide quality for the pt.” S/p witnessed fall per chart note. Pt attempted to sit on bed but instead sat on floor with RNs assistance. 1:1 supervision initiated.

## 2018-05-19 NOTE — PROGRESS NOTE ADULT - PROBLEM SELECTOR PLAN 7
- c/w sertraline moderate control  - c/w home metoprolol  - will be conservative with bp management given age and comorbidities

## 2018-05-19 NOTE — PROGRESS NOTE ADULT - PROBLEM SELECTOR PLAN 5
as per TTE 5/2018  - palliative follow up, MOLST form filled, DNR/DNI  - seen by cards - C/w conservative diet: honey consistency dysphagia I diet.  - S/S eval  - daughters are HCP and are clear they do not want PEG for their mom. They understand pt is high aspiration risk and that even the most conservative diet can predispose pt to aspiration, respiratory distress, PNA and even death. They are content with trying the current diet, but if pt gives resistance or dislikes the diet, they would like to liberalize diet to provide quality for the pt

## 2018-05-19 NOTE — PROGRESS NOTE ADULT - PROBLEM SELECTOR PLAN 2
due to enterovirus infection with likely superimposed aspiration PNA- per daughter, pt does have coughing and clearance after  liquids/food  - d/w S&S - family would like to defer any additional testing such as MDS and prefer comfort feeds  - c/w Zosyn; blood cx's are negative  - monitor leukocytosis  - duonebs q6h atc  - symptomatic support

## 2018-05-19 NOTE — SWALLOW BEDSIDE ASSESSMENT ADULT - PHARYNGEAL PHASE
Decreased laryngeal elevation/patient with baseline productive cough and wet breath sounds which is noted throughout assessment; frequency increased post intake of thin liquids/Delayed pharyngeal swallow

## 2018-05-19 NOTE — SWALLOW BEDSIDE ASSESSMENT ADULT - SWALLOW EVAL: FUNCTIONAL LEVEL AT TIME OF EVAL
Hx contd: Imaging Hx: CXR: 5/16 Patchy airspace consolidation seen in the left lower lobe suspicious for pneumonia. Approximately 2 cm right upper lobe mass. Further evaluation with CT scan imaging of the chest is recommended. 5/17 CXR: Left lower lung pneumonia. Small left pleural effusion.

## 2018-05-19 NOTE — PROGRESS NOTE ADULT - PROBLEM SELECTOR PLAN 4
- C/w conservative diet: honey consistency dysphagia I diet.  - S/S eval  - daughters are HCP and are clear they do not want PEG for their mom. They understand pt is high aspiration risk and that even the most conservative diet can predispose pt to aspiration, respiratory distress, PNA and even death. They are content with trying the current diet, but if pt gives resistance or dislikes the diet, they would like to liberalize diet to provide quality for the pt tachypnea resolved- c/w nebulizers ATC  - Chest percussion therapy w/ vest and physical chest PT ordered to help mobilize secretions  - suction secretions q6h  - now off O2 nasal cannula, monitor off to keep O2>90%

## 2018-05-19 NOTE — PROGRESS NOTE ADULT - PROBLEM SELECTOR PLAN 3
tachypnea resolved- c/w nebulizers ATC  - Chest percussion therapy w/ vest and physical chest PT ordered to help mobilize secretions  - suction secretions q6h  - now off O2 nasal cannula, monitor off to keep O2>90% K 3.1, repleting  likely due to poor po intake  - f/u repeat K at 4pm  - monitor k and mg

## 2018-05-19 NOTE — PROGRESS NOTE ADULT - PROBLEM SELECTOR PLAN 9
Pt is DNR/DNI; palliative care recs appreciated  - pt was referred for hospice eval DVT ppx w/ heparin sq.  PT eval pending  f/u hospice referral

## 2018-05-19 NOTE — SWALLOW BEDSIDE ASSESSMENT ADULT - ASR SWALLOW RECOMMEND DIAG
Although unclear role for objective assessment given pt/family wish to continue liberal PO diet regardless of findings, pt's HCP requesting MBS to formally assess swallow and r/o aspiration

## 2018-05-19 NOTE — SWALLOW BEDSIDE ASSESSMENT ADULT - COMMENTS
Hx contd: Dx 4) Aspiration into respiratory tract: Gurgling noted on exam. Also cough at home with eating c/w aspiration. Family does not want invasive procedures. Will call family again in the am to confirm no PEG planning before order speech and swallow study. C/w lowest risk honey consistency dysphagia I diet. Per Attending statement RVP + for enterovirus, CXR with left lower lobe opacity , ekg with s-t depressions in v4-5; will treat for viral pneumonia w/ superimposed bacterial pneumonia with vanc and zosyn , patient currently refusing oxygen supplementation , will tx w/ nebs and chest pt . Per extensive discussion between cardiology and family, family wishes to avoid aggressive invasive measures , declines cath , and would like to maintain a DNR/DNI order for pt.  HC: 5/17 Seen by Cards, The pt meets SIRS w/ leukocytosis and sinus tachycardia and had a clear source of infection based on clinical exam and CXR - sepsis 2/2 PNA (w/ elevated lactate - severe sepsis). The mild elevation in her Trop I is likely in the setting of severe sepsis. Her elevation in BNP is also likely in this setting. Clinically the patient is not having an acute coronary syndrome. Re: severe sepsis 2/2 PNA; given that she is coughing after eating MD suspect aspiration may be playing a role. Re: Type II MI, Can start ASA 81 but would not use a second antiplatelet. Would not start statin given overall clinical picture. MD d/w family, They would like the patient to be DNR/DNI, no pressors, no escalation of care. In this setting, if the patient were to develop ACS a LHC would not be aligned with the families wishes. Would include Palliative in her care.  Per provider contact note O2 between 85-88% on room air. Pt refusing any source of oxygen supplementation despite multiple attempts.     *contd below Hx contd: Dx 4) Aspiration into respiratory tract: Gurgling noted on exam. Also cough at home with eating c/w aspiration. Family does not want invasive procedures. Will call family again in the am to confirm no PEG planning before order speech and swallow study. C/w lowest risk honey consistency dysphagia I diet. Per Attending statement RVP + for enterovirus, CXR with left lower lobe opacity, ekg with s-t depressions in v4-5; will treat for viral pneumonia w/ superimposed bacterial pneumonia with vanc and zosyn , patient currently refusing oxygen supplementation , will tx w/ nebs and chest pt . Per extensive discussion between cardiology and family, family wishes to avoid aggressive invasive measures , declines cath , and would like to maintain a DNR/DNI order for pt.  HC: 5/17 Seen by Cards, The pt meets SIRS w/ leukocytosis and sinus tachycardia and had a clear source of infection based on clinical exam and CXR - sepsis 2/2 PNA (w/ elevated lactate - severe sepsis). The mild elevation in her Trop I is likely in the setting of severe sepsis. Her elevation in BNP is also likely in this setting. Clinically the patient is not having an acute coronary syndrome. Re: severe sepsis 2/2 PNA; given that she is coughing after eating MD suspect aspiration may be playing a role. Re: Type II MI, Can start ASA 81 but would not use a second antiplatelet. Would not start statin given overall clinical picture. MD d/w family, They would like the patient to be DNR/DNI, no pressors, no escalation of care. In this setting, if the patient were to develop ACS a LHC would not be aligned with the families wishes. Would include Palliative in her care.  Per provider contact note O2 between 85-88% on room air. Pt refusing any source of oxygen supplementation despite multiple attempts.     *contd below

## 2018-05-20 LAB
ANION GAP SERPL CALC-SCNC: 13 MMOL/L — SIGNIFICANT CHANGE UP (ref 5–17)
BUN SERPL-MCNC: 23 MG/DL — SIGNIFICANT CHANGE UP (ref 7–23)
CALCIUM SERPL-MCNC: 9.1 MG/DL — SIGNIFICANT CHANGE UP (ref 8.4–10.5)
CHLORIDE SERPL-SCNC: 109 MMOL/L — HIGH (ref 96–108)
CO2 SERPL-SCNC: 24 MMOL/L — SIGNIFICANT CHANGE UP (ref 22–31)
CREAT SERPL-MCNC: 0.69 MG/DL — SIGNIFICANT CHANGE UP (ref 0.5–1.3)
GLUCOSE SERPL-MCNC: 165 MG/DL — HIGH (ref 70–99)
HCT VFR BLD CALC: 30.7 % — LOW (ref 34.5–45)
HGB BLD-MCNC: 9.8 G/DL — LOW (ref 11.5–15.5)
LACTATE SERPL-SCNC: 1.6 MMOL/L — SIGNIFICANT CHANGE UP (ref 0.7–2)
MAGNESIUM SERPL-MCNC: 2.6 MG/DL — SIGNIFICANT CHANGE UP (ref 1.6–2.6)
MCHC RBC-ENTMCNC: 27.8 PG — SIGNIFICANT CHANGE UP (ref 27–34)
MCHC RBC-ENTMCNC: 31.9 GM/DL — LOW (ref 32–36)
MCV RBC AUTO: 87 FL — SIGNIFICANT CHANGE UP (ref 80–100)
PLATELET # BLD AUTO: 285 K/UL — SIGNIFICANT CHANGE UP (ref 150–400)
POTASSIUM SERPL-MCNC: 3.8 MMOL/L — SIGNIFICANT CHANGE UP (ref 3.5–5.3)
POTASSIUM SERPL-SCNC: 3.8 MMOL/L — SIGNIFICANT CHANGE UP (ref 3.5–5.3)
RBC # BLD: 3.53 M/UL — LOW (ref 3.8–5.2)
RBC # FLD: 16.4 % — HIGH (ref 10.3–14.5)
SODIUM SERPL-SCNC: 146 MMOL/L — HIGH (ref 135–145)
WBC # BLD: 12.26 K/UL — HIGH (ref 3.8–10.5)
WBC # FLD AUTO: 12.26 K/UL — HIGH (ref 3.8–10.5)

## 2018-05-20 PROCEDURE — 96374 THER/PROPH/DIAG INJ IV PUSH: CPT

## 2018-05-20 PROCEDURE — 96375 TX/PRO/DX INJ NEW DRUG ADDON: CPT

## 2018-05-20 PROCEDURE — 93005 ELECTROCARDIOGRAM TRACING: CPT

## 2018-05-20 PROCEDURE — 71045 X-RAY EXAM CHEST 1 VIEW: CPT

## 2018-05-20 PROCEDURE — 99291 CRITICAL CARE FIRST HOUR: CPT | Mod: 25

## 2018-05-20 PROCEDURE — 84484 ASSAY OF TROPONIN QUANT: CPT

## 2018-05-20 PROCEDURE — 83605 ASSAY OF LACTIC ACID: CPT

## 2018-05-20 PROCEDURE — 99292 CRITICAL CARE ADDL 30 MIN: CPT

## 2018-05-20 PROCEDURE — 80053 COMPREHEN METABOLIC PANEL: CPT

## 2018-05-20 PROCEDURE — 99233 SBSQ HOSP IP/OBS HIGH 50: CPT

## 2018-05-20 PROCEDURE — 87040 BLOOD CULTURE FOR BACTERIA: CPT

## 2018-05-20 PROCEDURE — 85027 COMPLETE CBC AUTOMATED: CPT

## 2018-05-20 PROCEDURE — 94640 AIRWAY INHALATION TREATMENT: CPT

## 2018-05-20 PROCEDURE — 83880 ASSAY OF NATRIURETIC PEPTIDE: CPT

## 2018-05-20 RX ORDER — POTASSIUM CHLORIDE 20 MEQ
40 PACKET (EA) ORAL ONCE
Qty: 0 | Refills: 0 | Status: COMPLETED | OUTPATIENT
Start: 2018-05-20 | End: 2018-05-20

## 2018-05-20 RX ADMIN — PIPERACILLIN AND TAZOBACTAM 25 GRAM(S): 4; .5 INJECTION, POWDER, LYOPHILIZED, FOR SOLUTION INTRAVENOUS at 05:42

## 2018-05-20 RX ADMIN — HEPARIN SODIUM 5000 UNIT(S): 5000 INJECTION INTRAVENOUS; SUBCUTANEOUS at 21:40

## 2018-05-20 RX ADMIN — Medication 81 MILLIGRAM(S): at 11:34

## 2018-05-20 RX ADMIN — PIPERACILLIN AND TAZOBACTAM 25 GRAM(S): 4; .5 INJECTION, POWDER, LYOPHILIZED, FOR SOLUTION INTRAVENOUS at 13:45

## 2018-05-20 RX ADMIN — Medication 3 MILLILITER(S): at 11:34

## 2018-05-20 RX ADMIN — Medication 25 MILLIGRAM(S): at 21:40

## 2018-05-20 RX ADMIN — HEPARIN SODIUM 5000 UNIT(S): 5000 INJECTION INTRAVENOUS; SUBCUTANEOUS at 05:43

## 2018-05-20 RX ADMIN — Medication 3 MILLILITER(S): at 17:24

## 2018-05-20 RX ADMIN — HEPARIN SODIUM 5000 UNIT(S): 5000 INJECTION INTRAVENOUS; SUBCUTANEOUS at 13:45

## 2018-05-20 RX ADMIN — Medication 50 MILLIGRAM(S): at 05:43

## 2018-05-20 RX ADMIN — SERTRALINE 50 MILLIGRAM(S): 25 TABLET, FILM COATED ORAL at 11:34

## 2018-05-20 RX ADMIN — Medication 3 MILLILITER(S): at 05:42

## 2018-05-20 RX ADMIN — PIPERACILLIN AND TAZOBACTAM 25 GRAM(S): 4; .5 INJECTION, POWDER, LYOPHILIZED, FOR SOLUTION INTRAVENOUS at 21:40

## 2018-05-20 NOTE — CHART NOTE - NSCHARTNOTEFT_GEN_A_CORE
RN notified provider of ~4.2secs of PAF with HR in the 140s-150s for the first time. Patient was asleep at time of occurrence. Spontaneously reverted back to NSR with HR in the 80s. BP at the time was 170 systolic. Patient seen and assessed by RN and reported no complaints, concerns. Denies chest pain, palpitations, dizziness, lightheadedness, and headache.     Electrolytes from this AM were examined and K was ordered for repletion with goal above 4. AM labs were ordered to reassess electrolytes in the AM.    Jayne Valladares PA-C

## 2018-05-20 NOTE — PROGRESS NOTE ADULT - PROBLEM SELECTOR PLAN 5
- C/w conservative diet: honey consistency dysphagia I diet.  - S/S eval - pending MBS  - daughters are HCP and are clear they do not want PEG for their mom. They understand pt is high aspiration risk and that even the most conservative diet can predispose pt to aspiration, respiratory distress, PNA and even death. They are content with trying the current diet, but if pt gives resistance or dislikes the diet, they would like to liberalize diet to provide quality for the pt - C/w conservative diet: honey consistency dysphagia I diet.  - S/S eval - was scheduled for MBS, but after d/w HCP no longer would like this as they would prefer comfort feeds for patient  - daughters are HCP and are clear they do not want PEG for their mom. They understand pt is high aspiration risk and that even the most conservative diet can predispose pt to aspiration, respiratory distress, PNA and even death. They are content with trying the current diet, but if pt gives resistance or dislikes the diet, they would like to liberalize diet to provide quality for the pt

## 2018-05-20 NOTE — PROGRESS NOTE ADULT - PROBLEM SELECTOR PLAN 7
moderate control  - c/w home metoprolol  - will be conservative with bp management given age and comorbidities

## 2018-05-20 NOTE — PROGRESS NOTE ADULT - PROBLEM SELECTOR PLAN 10
Pt is DNR/DNI; palliative care recs appreciated  - pt was referred for hospice eval DVT ppx w/ heparin sq  PT eval pending  f/u hospice referral  d/w Brianne at length over the phone  dispo planning: to home possibly tomorrow 5/21 with hospice services after completion of zosyn abx

## 2018-05-20 NOTE — PROGRESS NOTE ADULT - PROBLEM SELECTOR PLAN 9
DVT ppx w/ heparin sq.  PT eval pending  f/u hospice referral Pt is DNR/DNI; palliative care recs appreciated  - pt was referred for hospice eval, d/w hospice RN  - d/w Brianne HCP over phone  - No PEG feeds, prefer comfort feeds, to d/c home w/ hospice and visiting nurse  20 minutes spent advanced care planning.

## 2018-05-20 NOTE — PROGRESS NOTE ADULT - PROBLEM SELECTOR PLAN 4
tachypnea resolved- c/w nebulizers ATC  - Chest percussion therapy w/ vest and physical chest PT ordered to help mobilize secretions  - suction secretions q6h  - now off O2 nasal cannula, monitor off to keep O2>90%

## 2018-05-20 NOTE — PROGRESS NOTE ADULT - PROBLEM SELECTOR PLAN 2
due to enterovirus infection with likely superimposed aspiration PNA- per daughter, pt does have coughing and clearance after liquids/food  sepsis on admission, now resolving, lactic acidosis resolved  - d/w S&S - family now agreeable to MBS - f/u  - c/w Zosyn 5/17- for 5 d course; blood cx's are negative  - monitor leukocytosis  - duonebs q6h atc  - symptomatic support

## 2018-05-21 ENCOUNTER — TRANSCRIPTION ENCOUNTER (OUTPATIENT)
Age: 83
End: 2018-05-21

## 2018-05-21 VITALS
OXYGEN SATURATION: 95 % | SYSTOLIC BLOOD PRESSURE: 164 MMHG | HEART RATE: 87 BPM | DIASTOLIC BLOOD PRESSURE: 89 MMHG | RESPIRATION RATE: 17 BRPM

## 2018-05-21 LAB
ANION GAP SERPL CALC-SCNC: 11 MMOL/L — SIGNIFICANT CHANGE UP (ref 5–17)
BUN SERPL-MCNC: 29 MG/DL — HIGH (ref 7–23)
CALCIUM SERPL-MCNC: 8.9 MG/DL — SIGNIFICANT CHANGE UP (ref 8.4–10.5)
CHLORIDE SERPL-SCNC: 110 MMOL/L — HIGH (ref 96–108)
CO2 SERPL-SCNC: 24 MMOL/L — SIGNIFICANT CHANGE UP (ref 22–31)
CREAT SERPL-MCNC: 0.86 MG/DL — SIGNIFICANT CHANGE UP (ref 0.5–1.3)
GLUCOSE SERPL-MCNC: 175 MG/DL — HIGH (ref 70–99)
HCT VFR BLD CALC: 32.1 % — LOW (ref 34.5–45)
HGB BLD-MCNC: 10 G/DL — LOW (ref 11.5–15.5)
MAGNESIUM SERPL-MCNC: 2.7 MG/DL — HIGH (ref 1.6–2.6)
MCHC RBC-ENTMCNC: 27.5 PG — SIGNIFICANT CHANGE UP (ref 27–34)
MCHC RBC-ENTMCNC: 31.2 GM/DL — LOW (ref 32–36)
MCV RBC AUTO: 88.2 FL — SIGNIFICANT CHANGE UP (ref 80–100)
NRBC # BLD: 2 /100 WBCS — HIGH (ref 0–0)
PLATELET # BLD AUTO: 293 K/UL — SIGNIFICANT CHANGE UP (ref 150–400)
POTASSIUM SERPL-MCNC: 4.6 MMOL/L — SIGNIFICANT CHANGE UP (ref 3.5–5.3)
POTASSIUM SERPL-SCNC: 4.6 MMOL/L — SIGNIFICANT CHANGE UP (ref 3.5–5.3)
RBC # BLD: 3.64 M/UL — LOW (ref 3.8–5.2)
RBC # FLD: 16.7 % — HIGH (ref 10.3–14.5)
SODIUM SERPL-SCNC: 145 MMOL/L — SIGNIFICANT CHANGE UP (ref 135–145)
WBC # BLD: 13.32 K/UL — HIGH (ref 3.8–10.5)
WBC # FLD AUTO: 13.32 K/UL — HIGH (ref 3.8–10.5)

## 2018-05-21 PROCEDURE — 99239 HOSP IP/OBS DSCHRG MGMT >30: CPT

## 2018-05-21 PROCEDURE — 80048 BASIC METABOLIC PNL TOTAL CA: CPT

## 2018-05-21 PROCEDURE — 84132 ASSAY OF SERUM POTASSIUM: CPT

## 2018-05-21 PROCEDURE — 83735 ASSAY OF MAGNESIUM: CPT

## 2018-05-21 PROCEDURE — 85014 HEMATOCRIT: CPT

## 2018-05-21 PROCEDURE — 82553 CREATINE MB FRACTION: CPT

## 2018-05-21 PROCEDURE — 93005 ELECTROCARDIOGRAM TRACING: CPT

## 2018-05-21 PROCEDURE — 92610 EVALUATE SWALLOWING FUNCTION: CPT

## 2018-05-21 PROCEDURE — 82330 ASSAY OF CALCIUM: CPT

## 2018-05-21 PROCEDURE — 94660 CPAP INITIATION&MGMT: CPT

## 2018-05-21 PROCEDURE — 80053 COMPREHEN METABOLIC PANEL: CPT

## 2018-05-21 PROCEDURE — 82550 ASSAY OF CK (CPK): CPT

## 2018-05-21 PROCEDURE — 87486 CHLMYD PNEUM DNA AMP PROBE: CPT

## 2018-05-21 PROCEDURE — 85027 COMPLETE CBC AUTOMATED: CPT

## 2018-05-21 PROCEDURE — 87798 DETECT AGENT NOS DNA AMP: CPT

## 2018-05-21 PROCEDURE — 87633 RESP VIRUS 12-25 TARGETS: CPT

## 2018-05-21 PROCEDURE — 84295 ASSAY OF SERUM SODIUM: CPT

## 2018-05-21 PROCEDURE — 87581 M.PNEUMON DNA AMP PROBE: CPT

## 2018-05-21 PROCEDURE — 94640 AIRWAY INHALATION TREATMENT: CPT

## 2018-05-21 PROCEDURE — 82803 BLOOD GASES ANY COMBINATION: CPT

## 2018-05-21 PROCEDURE — 85730 THROMBOPLASTIN TIME PARTIAL: CPT

## 2018-05-21 PROCEDURE — 71045 X-RAY EXAM CHEST 1 VIEW: CPT

## 2018-05-21 PROCEDURE — 82947 ASSAY GLUCOSE BLOOD QUANT: CPT

## 2018-05-21 PROCEDURE — 83605 ASSAY OF LACTIC ACID: CPT

## 2018-05-21 PROCEDURE — 99285 EMERGENCY DEPT VISIT HI MDM: CPT | Mod: 25

## 2018-05-21 PROCEDURE — 82435 ASSAY OF BLOOD CHLORIDE: CPT

## 2018-05-21 PROCEDURE — 87040 BLOOD CULTURE FOR BACTERIA: CPT

## 2018-05-21 PROCEDURE — 84484 ASSAY OF TROPONIN QUANT: CPT

## 2018-05-21 PROCEDURE — 96374 THER/PROPH/DIAG INJ IV PUSH: CPT

## 2018-05-21 RX ORDER — ASPIRIN/CALCIUM CARB/MAGNESIUM 324 MG
1 TABLET ORAL
Qty: 30 | Refills: 0 | OUTPATIENT
Start: 2018-05-21 | End: 2018-06-19

## 2018-05-21 RX ORDER — IPRATROPIUM/ALBUTEROL SULFATE 18-103MCG
3 AEROSOL WITH ADAPTER (GRAM) INHALATION
Qty: 1 | Refills: 0 | OUTPATIENT
Start: 2018-05-21 | End: 2018-06-19

## 2018-05-21 RX ADMIN — HEPARIN SODIUM 5000 UNIT(S): 5000 INJECTION INTRAVENOUS; SUBCUTANEOUS at 05:41

## 2018-05-21 RX ADMIN — PIPERACILLIN AND TAZOBACTAM 25 GRAM(S): 4; .5 INJECTION, POWDER, LYOPHILIZED, FOR SOLUTION INTRAVENOUS at 05:41

## 2018-05-21 RX ADMIN — Medication 3 MILLILITER(S): at 05:41

## 2018-05-21 RX ADMIN — Medication 50 MILLIGRAM(S): at 05:41

## 2018-05-21 NOTE — CHART NOTE - NSCHARTNOTEFT_GEN_A_CORE
Upon Nutritional Assessment by the Registered Dietitian your patient was determined to meet criteria / has evidence of the following diagnosis/diagnoses:          [ ]  Mild Protein Calorie Malnutrition        [ ]  Moderate Protein Calorie Malnutrition        [x] Severe Protein Calorie Malnutrition        [ ] Unspecified Protein Calorie Malnutrition        [ ] Underweight / BMI <19        [ ] Morbid Obesity / BMI > 40      Findings as based on:  [x] Comprehensive nutrition assessment   [ ] Nutrition Focused Physical Exam  [ ] Other:       Nutrition Plan/Recommendations:      Please see RD initial assessment for interventions and recommendations    PROVIDER Section:     By signing this assessment you are acknowledging and agree with the diagnosis/diagnoses assigned by the Registered Dietitian    Comments:

## 2018-05-21 NOTE — PROGRESS NOTE ADULT - PROBLEM SELECTOR PLAN 9
Pt is DNR/DNI; palliative care recs appreciated  - pt was referred for hospice michelle, d/w hospice RN to set up for home hospice and visiting nurse

## 2018-05-21 NOTE — DISCHARGE NOTE ADULT - MEDICATION SUMMARY - MEDICATIONS TO TAKE
I will START or STAY ON the medications listed below when I get home from the hospital:    aspirin 81 mg oral tablet, chewable  -- 1 tab(s) by mouth once a day  -- Indication: For Need for prophylactic measure    sertraline 50 mg oral tablet  -- 1 tab(s) by mouth once a day  -- Indication: For Depression    metoprolol tartrate 25 mg oral tablet  -- 1 tab(s) by mouth once a day in the evening  -- Indication: For Essential hypertension    metoprolol tartrate 50 mg oral tablet  -- 1 tab(s) by mouth once a day in the morning  -- Indication: For Essential hypertension    ipratropium-albuterol 0.5 mg-2.5 mg/3 mLinhalation solution  -- 3 milliliter(s) inhaled every 6 hours  -- Indication: For Aspiration into respiratory tract

## 2018-05-21 NOTE — PROGRESS NOTE ADULT - ASSESSMENT
89F w/ PMHx of Dementia (AOx1 @ baseline), Severe AS and HTN who was a/e sepsis secondary to likely aspiration PNA and NSTEMI
89F w/ PMHx of Dementia (AOx1 @ baseline), Severe AS and HTN who was a/e sepsis secondary to likely aspiration PNA and NSTEMI
89 Y/O F PMH htn, severe dementia (aaox1), severe AS, [facility] +Weakness found questionable + stemi and tx for cath, found to have NSTEMI, sepsis +RVP for rhino/entero w/ superimposed aspiration PNA, acute hypoxic resp failure, now improving, pending home hospice.
89 Y/O F PMH htn, severe dementia (aaox1), severe AS, HTN sent from [facility] w/ SOB +Weakness found questionable + stemi and tx for cath, found to have NSTEMI, sepsis +RVP for rhino/entero w/ superimposed aspiration PNA, acute hypoxic resp failure.
88 Y/O F PMH htn, severe dementia (aaox1), severe AS, [facility] +Weakness found questionable + stemi and tx for cath, found to have NSTEMI, sepsis +RVP for rhino/entero w/ superimposed aspiration PNA, acute hypoxic resp failure.

## 2018-05-21 NOTE — PROGRESS NOTE ADULT - PROBLEM SELECTOR PLAN 10
DVT ppx w/ heparin sq  f/u hospice referral for home hospice  d/w Brianne at bedside  dispo planning: to home possibly today 5/21 with hospice services after completion of zosyn abx DVT ppx w/ heparin sq  f/u hospice referral for home hospice  d/w Brianne at bedside  dispo planning: to home today 5/21 with hospice services after completion of zosyn abx today

## 2018-05-21 NOTE — PROGRESS NOTE ADULT - PROBLEM SELECTOR PLAN 5
- C/w conservative diet: honey consistency dysphagia I diet.  - S/S eval - was scheduled for MBS, but after d/w HCP no longer would like this as they would prefer comfort feeds for patient  - daughters are HCP and are clear they do not want PEG for their mom. They understand pt is high aspiration risk and that even the most conservative diet can predispose pt to aspiration, respiratory distress, PNA and even death. They are content with trying the current diet, but if pt gives resistance or dislikes the diet, they would like to liberalize diet to provide quality for the pt

## 2018-05-21 NOTE — DISCHARGE NOTE ADULT - PATIENT PORTAL LINK FT
You can access the [facility] Patient Portal, offered by [facility], by registering with the following website: [email or web address]

## 2018-05-21 NOTE — DIETITIAN INITIAL EVALUATION ADULT. - NS AS NUTRI INTERV MEDICAL AND FOOD SUPPLEMENTS
Per daughter they are not planning to continue supplementation with Ensure at home as pt appears to dislike it, discussed alternate supplement options and home-made high kcal/protein shake recipes to try at home if desired, pt voiced understanding.

## 2018-05-21 NOTE — SWALLOW VFSS/MBS ASSESSMENT ADULT - DIAGNOSTIC IMPRESSIONS
Pt scheduled for MBS this AM however, as per hospitalist note 5/20/18:   "S/S eval - was scheduled for MBS, but after d/w HCP no longer would like this as they would prefer comfort feeds for patient."   Please see hospitalist note for additional information. Consult for MBS has been discontinued by MD Villafuerte. MD please reconsult this service should a change in status occur.

## 2018-05-21 NOTE — DISCHARGE NOTE ADULT - PLAN OF CARE
Symptoms completed Pneumonia is a lung infection that can cause a fever, cough, and trouble breathing.  Continue all antibiotics as ordered until complete.  Nutrition is important, eat small frequent meals.  Get lots of rest and drink fluids.  Call your health care provider upon arrival home from hospital and make a follow up appointment for one week.  If your cough worsens, you develop fever greater than 101', you have shaking chills, a fast heartbeat, trouble breathing and/or feel your are breathing much faster than usual, call your healthcare provider.  Make sure you wash your hands frequently. Low salt diet  Activity as tolerated.  Take all medication as prescribed.  Follow up with your medical doctor for routine blood pressure monitoring at your next visit.  Notify your doctor if you have any of the following symptoms:   Dizziness, Lightheadedness, Blurry vision, Headache, Chest pain, Shortness of breath Home hospice set up in progress Continue all medications as ordered  - No invasive workup , home hospice You finished antibiotics. You also had a viral infection. Your breathing improved.  Nutrition is important, eat small frequent meals.  Get lots of rest and drink fluids.  Call your health care provider upon arrival home from hospital and make a follow up appointment for one week.  If your cough worsens, you develop fever greater than 101', you have shaking chills, a fast heartbeat, trouble breathing and/or feel your are breathing much faster than usual, call your healthcare provider.  Make sure you wash your hands frequently. Home hospice set up completed

## 2018-05-21 NOTE — PROGRESS NOTE ADULT - PROBLEM SELECTOR PLAN 2
due to enterovirus infection with likely superimposed aspiration PNA- per daughter, pt does have coughing and clearance after liquids/food  sepsis on admission, now resolving, lactic acidosis resolved  - d/w S&S, family not interested in MBS at this time. will hold of on further eval and pursue comfort feeds   - c/w Zosyn 5/17- for 5 d course to finish today 5/21; blood cx's are negative  - monitor leukocytosis  - duonebs q6h atc  - symptomatic support due to enterovirus infection with likely superimposed aspiration PNA- per daughter, pt does have coughing and clearance after liquids/food  sepsis on admission, now resolving, lactic acidosis resolved  - d/w S&S, family not interested in MBS at this time. will hold of on further eval and pursue comfort feeds   - c/w Zosyn 5/17- for 5 d course to finish today 5/21; blood cx's are negative  - monitor leukocytosis stabilizing 11-14, likely due to continued aspiration and family HCP prefer comfort feeds and no formal MBS.  - duonebs q6h atc  - symptomatic support

## 2018-05-21 NOTE — DIETITIAN INITIAL EVALUATION ADULT. - ADHERENCE
Pt did not follow a particular modified diet PTA. Per daughter, was able to tolerate soft diet PTA./n/a

## 2018-05-21 NOTE — DISCHARGE NOTE ADULT - CARE PLAN
Principal Discharge DX:	Pneumonia due to infectious organism, unspecified laterality, unspecified part of lung  Goal:	Symptoms completed  Assessment and plan of treatment:	Pneumonia is a lung infection that can cause a fever, cough, and trouble breathing.  Continue all antibiotics as ordered until complete.  Nutrition is important, eat small frequent meals.  Get lots of rest and drink fluids.  Call your health care provider upon arrival home from hospital and make a follow up appointment for one week.  If your cough worsens, you develop fever greater than 101', you have shaking chills, a fast heartbeat, trouble breathing and/or feel your are breathing much faster than usual, call your healthcare provider.  Make sure you wash your hands frequently.  Secondary Diagnosis:	Essential hypertension  Assessment and plan of treatment:	Low salt diet  Activity as tolerated.  Take all medication as prescribed.  Follow up with your medical doctor for routine blood pressure monitoring at your next visit.  Notify your doctor if you have any of the following symptoms:   Dizziness, Lightheadedness, Blurry vision, Headache, Chest pain, Shortness of breath  Secondary Diagnosis:	Encounter for palliative care  Assessment and plan of treatment:	Home hospice set up in progress  Secondary Diagnosis:	NSTEMI (non-ST elevated myocardial infarction)  Assessment and plan of treatment:	Continue all medications as ordered  - No invasive workup , home hospice Principal Discharge DX:	Pneumonia due to infectious organism, unspecified laterality, unspecified part of lung  Goal:	Symptoms completed  Assessment and plan of treatment:	You finished antibiotics. You also had a viral infection. Your breathing improved.  Nutrition is important, eat small frequent meals.  Get lots of rest and drink fluids.  Call your health care provider upon arrival home from hospital and make a follow up appointment for one week.  If your cough worsens, you develop fever greater than 101', you have shaking chills, a fast heartbeat, trouble breathing and/or feel your are breathing much faster than usual, call your healthcare provider.  Make sure you wash your hands frequently.  Secondary Diagnosis:	Essential hypertension  Assessment and plan of treatment:	Low salt diet  Activity as tolerated.  Take all medication as prescribed.  Follow up with your medical doctor for routine blood pressure monitoring at your next visit.  Notify your doctor if you have any of the following symptoms:   Dizziness, Lightheadedness, Blurry vision, Headache, Chest pain, Shortness of breath  Secondary Diagnosis:	Encounter for palliative care  Assessment and plan of treatment:	Home hospice set up completed  Secondary Diagnosis:	NSTEMI (non-ST elevated myocardial infarction)  Assessment and plan of treatment:	Continue all medications as ordered  - No invasive workup , home hospice

## 2018-05-21 NOTE — DISCHARGE NOTE ADULT - INSTRUCTIONS
Dysphagia 1 pureed honey consistency    Ensure clear 3x day Dysphagia 1 pureed honey consistency   Ensure clear 3x day

## 2018-05-21 NOTE — CHART NOTE - NSCHARTNOTEFT_GEN_A_CORE
Spoke with   , asked to facilitate patient discharge home. Medication reconciliation reviewed, revised and resolved with Dr Iverson   who has medically cleared patient for discharge with follow up advised

## 2018-05-21 NOTE — DIETITIAN INITIAL EVALUATION ADULT. - OTHER INFO
Pt seen for consult: nutrition assessment. Pt with advanced dementia, noted as confused and disoriented, therefore interview deferred to daughter at bedside. Per daughter, pt hasn't been eating well in-house (<25% of meals) as she dislikes some of the pureed foods served. Not drinking much of supplements provided. Reportedly has progressively lost ~18 pounds over the past 7 years due to her worsening dementia and decline in po. Pt benefits from meal set-up assist and feeding at this time. Reports pt with occasional coughing with intake of meals and fluids, however plans on pleasure feeds for pt of puree diet and honey thickened liquids with some softened foods as tolerated. Daughter also inquiring about pureed meal prep/thickened liquids prep and supplement options for use at home.

## 2018-05-21 NOTE — PROGRESS NOTE ADULT - ATTENDING COMMENTS
TIFF BULL MD  HOSPITALIST  #[phone] discharge time 60 minutes.    TIFF BULL MD  HOSPITALIST  #[phone]

## 2018-05-21 NOTE — DIETITIAN INITIAL EVALUATION ADULT. - NS AS NUTRI INTERV MEALS SNACK
Texture-modified diet/Continue conservative dysphagia 1 pureed diet with honey thick consistency. Pt's food preferences obtained and honored on menu. Per daughter, will try to adhere to diet order at home but willing to liberalize consistency as pt desires. Provided daughter with pureed diet and thickened liquid handouts to assist with meal prep at home. Discussed commercial thickening agents and products to buy as needed. thickened fluid intake encouraged to prevent dehydration.

## 2018-05-21 NOTE — PROGRESS NOTE ADULT - SUBJECTIVE AND OBJECTIVE BOX
Patient is a 89y old  Female who presents with a chief complaint of STEMI transfer from [facility] (17 May 2018 04:38)        SUBJECTIVE / OVERNIGHT EVENTS:  overnight, no acute events. no agitation overnight per 1:1 at bedside.  pt seen at bedside denies complaints.    CAPILLARY BLOOD GLUCOSE    I&O's Summary    20 May 2018 07:01  -  21 May 2018 07:00  --------------------------------------------------------  IN: 800 mL / OUT: 0 mL / NET: 800 mL    Vital Signs Last 24 Hrs  T(C): 36.3 (21 May 2018 05:38), Max: 36.8 (20 May 2018 11:33)  T(F): 97.4 (21 May 2018 05:38), Max: 98.3 (20 May 2018 11:33)  HR: 90 (21 May 2018 05:38) (88 - 101)  BP: 162/69 (21 May 2018 05:38) (150/88 - 162/69)  BP(mean): --  RR: 18 (21 May 2018 05:38) (17 - 18)  SpO2: 97% (21 May 2018 05:38) (95% - 97%)    PHYSICAL EXAM:  GENERAL:  Chronically ill appearing thin elderly F, in NAD  HEAD:  NCAT  EYES: PERRLA, conjunctiva clear  NECK: Supple  CHEST/LUNG: CTA B/L. Occasional rhonchi. No w/r/r.  HEART: Reg rate. Normal S1, S2. III/VI systollic murmur at RUSB.  ABDOMEN: SNTND. Bowel sounds present  EXTREMITIES:  2+ Peripheral Pulses, No clubbing, cyanosis, edema.  darkened erythema of b/l shins  PSYCH: pleasant, unable to assess    LABS:                        10.0   13.32 )-----------( 293      ( 21 May 2018 06:40 )             32.1     05-21    145  |  110<H>  |  29<H>  ----------------------------<  175<H>  4.6   |  24  |  0.86    Ca    8.9      21 May 2018 05:21  Mg     2.7     05-21      RADIOLOGY & ADDITIONAL TESTS:  Consultant(s) Notes Reviewed:  s&s  Care Discussed with Consultants/Other Providers: Floor NP, hospice    MEDICATIONS  (STANDING):  ALBUTerol/ipratropium for Nebulization 3 milliLiter(s) Nebulizer every 6 hours  aspirin  chewable 81 milliGRAM(s) Oral daily  heparin  Injectable 5000 Unit(s) SubCutaneous every 8 hours  metoprolol tartrate 50 milliGRAM(s) Oral <User Schedule>  metoprolol tartrate 25 milliGRAM(s) Oral at bedtime  piperacillin/tazobactam IVPB. 3.375 Gram(s) IV Intermittent every 8 hours  sertraline 50 milliGRAM(s) Oral daily    MEDICATIONS  (PRN):
Patient is a 89y old  Female who presents with a chief complaint of STEMI transfer from [facility] (17 May 2018 04:38)      SUBJECTIVE / OVERNIGHT EVENTS: has productive cough and audible wheezing; afebrile;     MEDICATIONS  (STANDING):  ALBUTerol    0.083% 2.5 milliGRAM(s) Nebulizer every 6 hours  ALBUTerol    90 MICROgram(s) HFA Inhaler 1 Puff(s) Inhalation every 4 hours  aspirin  chewable 81 milliGRAM(s) Oral daily  heparin  Injectable 5000 Unit(s) SubCutaneous every 8 hours  metoprolol tartrate 50 milliGRAM(s) Oral <User Schedule>  metoprolol tartrate 25 milliGRAM(s) Oral at bedtime  piperacillin/tazobactam IVPB. 3.375 Gram(s) IV Intermittent every 8 hours  sertraline 50 milliGRAM(s) Oral daily    MEDICATIONS  (PRN):      Vital Signs Last 24 Hrs  T(C): 36.6 (18 May 2018 06:00), Max: 36.6 (18 May 2018 06:00)  T(F): 97.9 (18 May 2018 06:00), Max: 97.9 (18 May 2018 06:00)  HR: 67 (18 May 2018 10:15) (67 - 105)  BP: 152/70 (18 May 2018 06:00) (126/58 - 166/64)  BP(mean): --  RR: 18 (18 May 2018 06:00) (16 - 20)  SpO2: 97% (18 May 2018 10:15) (93% - 97%)  CAPILLARY BLOOD GLUCOSE        I&O's Summary    17 May 2018 07:01  -  18 May 2018 07:00  --------------------------------------------------------  IN: 600 mL / OUT: 0 mL / NET: 600 mL        TELEMETRY: SR 70s-90s    PHYSICAL EXAM:  GENERAL: NAD, well-developed  EYES: EOMI, PERRLA, conjunctiva and sclera clear  NECK: Supple, No JVD  CHEST/LUNG: Clear to auscultation bilaterally; No wheeze  HEART: Regular rate and rhythm; No murmurs, rubs, or gallops  ABDOMEN: Soft, Nontender, Nondistended; Bowel sounds present  EXTREMITIES:  2+ Peripheral Pulses, No clubbing, cyanosis, or edema  PSYCH: AAOx3  NEUROLOGY: non-focal; no gross sensory deficits; muscle strength 5/5 b/l UE and LE  SKIN: No rashes or lesions    LABS:                        10.3   11.94 )-----------( 244      ( 18 May 2018 07:52 )             33.2     05-18    140  |  104  |  42<H>  ----------------------------<  123<H>  3.6   |  21<L>  |  0.81    Ca    9.1      18 May 2018 07:04  Mg     2.4     05-17    TPro  7.9  /  Alb  2.5<L>  /  TBili  0.6  /  DBili  x   /  AST  23  /  ALT  13  /  AlkPhos  89  05-17    LIVER FUNCTIONS - ( 17 May 2018 06:16 )  Alb: 2.5 g/dL / Pro: 7.9 g/dL / ALK PHOS: 89 U/L / ALT: 13 U/L / AST: 23 U/L / GGT: x           PTT - ( 17 May 2018 06:16 )  PTT:25.2 sec  CARDIAC MARKERS ( 17 May 2018 06:16 )  x     / 0.09 ng/mL / 87 U/L / x     / 7.6 ng/mL  CARDIAC MARKERS ( 17 May 2018 00:04 )  x     / 0.10 ng/mL / x     / x     / x      CARDIAC MARKERS ( 16 May 2018 19:41 )  0.859 ng/mL / x     / x     / x     / x              RADIOLOGY & ADDITIONAL TESTS:    Imaging Personally Reviewed:    Consultant(s) Notes Reviewed:      Care Discussed with Consultants/Other Providers:    Contact Number: Spectra [phone]
Patient is a 89y old  Female who presents with a chief complaint of STEMI transfer from [facility] (17 May 2018 04:38)        SUBJECTIVE / OVERNIGHT EVENTS:  overnight, no acute events.  pt seen at bedside denies complaints.  w/ 1:1 at bedside.    CAPILLARY BLOOD GLUCOSE    I&O's Summary    18 May 2018 07:01  -  19 May 2018 07:00  --------------------------------------------------------  IN: 220 mL / OUT: 0 mL / NET: 220 mL    19 May 2018 07:01  -  19 May 2018 11:35  --------------------------------------------------------  IN: 120 mL / OUT: 1 mL / NET: 119 mL    Vital Signs Last 24 Hrs  T(C): 36.3 (19 May 2018 05:02), Max: 36.7 (18 May 2018 21:56)  T(F): 97.4 (19 May 2018 05:02), Max: 98.1 (18 May 2018 21:56)  HR: 68 (19 May 2018 05:04) (68 - 95)  BP: 166/55 (19 May 2018 05:02) (138/76 - 166/55)  BP(mean): --  RR: 19 (19 May 2018 05:02) (18 - 20)  SpO2: 95% (19 May 2018 05:04) (95% - 96%)    PHYSICAL EXAM:  GENERAL:  Chronically ill appearing elderly F, in NAD, w/ thick mucous production in throat  HEAD:  NCAT  EYES: PERRLA, conjunctiva clear  NECK: Supple  CHEST/LUNG: CTA B/L. No w/r/r.  HEART: Reg rate. Normal S1, S2. III/VI systollic murmur at RUSB.  ABDOMEN: SNTND. Bowel sounds present  EXTREMITIES:  2+ Peripheral Pulses, No clubbing, cyanosis, edema.  darkened erythema of b/l shins  PSYCH: pleasant, unable to assess    LABS:                        9.9    11.68 )-----------( 283      ( 19 May 2018 08:46 )             30.7     05-19    143  |  107  |  29<H>  ----------------------------<  148<H>  3.1<L>   |  26  |  0.73    Ca    9.0      19 May 2018 06:22    Culture - Blood (collected 17 May 2018 02:42)  Source: .Blood Blood-Peripheral  Preliminary Report (18 May 2018 03:02):    No growth to date.    Culture - Blood (collected 17 May 2018 02:42)  Source: .Blood Blood-Peripheral  Preliminary Report (18 May 2018 03:01):    No growth to date.    Culture - Blood (collected 16 May 2018 23:48)  Source: .Blood Blood  Preliminary Report (18 May 2018 01:02):    No growth to date.    Culture - Blood (collected 16 May 2018 23:48)  Source: .Blood Blood  Preliminary Report (18 May 2018 01:02):    No growth to date.    RADIOLOGY & ADDITIONAL TESTS:  Telemetry reviewed: sinus , PAT 1.8 sec and 2 sec  Consultant(s) Notes Reviewed:  cards, palliative, speech  Care Discussed with Consultants/Other Providers: Floor NP, speech and swallow    MEDICATIONS  (STANDING):  ALBUTerol/ipratropium for Nebulization 3 milliLiter(s) Nebulizer every 6 hours  aspirin  chewable 81 milliGRAM(s) Oral daily  heparin  Injectable 5000 Unit(s) SubCutaneous every 8 hours  metoprolol tartrate 50 milliGRAM(s) Oral <User Schedule>  metoprolol tartrate 25 milliGRAM(s) Oral at bedtime  piperacillin/tazobactam IVPB. 3.375 Gram(s) IV Intermittent every 8 hours  potassium chloride  10 mEq/100 mL IVPB 10 milliEquivalent(s) IV Intermittent every 1 hour  sertraline 50 milliGRAM(s) Oral daily    MEDICATIONS  (PRN):
Patient is a 89y old  Female who presents with a chief complaint of STEMI transfer from [facility] (17 May 2018 04:38)      SUBJECTIVE / OVERNIGHT EVENTS: Appears comfortable- denies CP, SOB- daughter at bedside;     MEDICATIONS  (STANDING):  ALBUTerol    0.083% 2.5 milliGRAM(s) Nebulizer every 6 hours  ALBUTerol    90 MICROgram(s) HFA Inhaler 1 Puff(s) Inhalation every 4 hours  aspirin  chewable 81 milliGRAM(s) Oral daily  heparin  Injectable 5000 Unit(s) SubCutaneous every 8 hours  piperacillin/tazobactam IVPB. 3.375 Gram(s) IV Intermittent every 8 hours    MEDICATIONS  (PRN):      Vital Signs Last 24 Hrs  T(C): 36.9 (17 May 2018 03:32), Max: 37.6 (17 May 2018 00:11)  T(F): 98.5 (17 May 2018 03:32), Max: 99.7 (17 May 2018 00:11)  HR: 89 (17 May 2018 10:15) (89 - 122)  BP: 116/56 (17 May 2018 04:38) (106/53 - 147/73)  BP(mean): --  RR: 22 (17 May 2018 04:38) (20 - 26)  SpO2: 95% (17 May 2018 10:15) (88% - 99%)  CAPILLARY BLOOD GLUCOSE        I&O's Summary    16 May 2018 07:01  -  17 May 2018 07:00  --------------------------------------------------------  IN: 300 mL / OUT: 0 mL / NET: 300 mL        TELEMETRY: SR 70s-90s    PHYSICAL EXAM:  GENERAL: NAD, well-developed; thin  EYES: EOMI, PERRLA,   NECK: Supple, No JVD  CHEST/LUNG: scattered wheezing with coarse BS b/l  HEART: Regular rate and rhythm;  ABDOMEN: Soft, Nontender, Nondistended; Bowel sounds present  EXTREMITIES:  2+ Peripheral Pulses, No edema  PSYCH: AAOx3  NEUROLOGY: non-focal; no gross sensory deficits; muscle strength 5/5 b/l UE and LE      LABS:                        11.0   14.0  )-----------( 204      ( 17 May 2018 06:16 )             34.8     05-17    138  |  100  |  48<H>  ----------------------------<  172<H>  3.4<L>   |  21<L>  |  1.01    Ca    9.0      17 May 2018 06:16  Mg     2.4     05-17    TPro  7.9  /  Alb  2.5<L>  /  TBili  0.6  /  DBili  x   /  AST  23  /  ALT  13  /  AlkPhos  89  05-17    LIVER FUNCTIONS - ( 17 May 2018 06:16 )  Alb: 2.5 g/dL / Pro: 7.9 g/dL / ALK PHOS: 89 U/L / ALT: 13 U/L / AST: 23 U/L / GGT: x           PTT - ( 17 May 2018 06:16 )  PTT:25.2 sec  CARDIAC MARKERS ( 17 May 2018 06:16 )  x     / 0.09 ng/mL / 87 U/L / x     / 7.6 ng/mL  CARDIAC MARKERS ( 17 May 2018 00:04 )  x     / 0.10 ng/mL / x     / x     / x      CARDIAC MARKERS ( 16 May 2018 19:41 )  0.859 ng/mL / x     / x     / x     / x              RADIOLOGY & ADDITIONAL TESTS:    Imaging Personally Reviewed:    Consultant(s) Notes Reviewed:      Care Discussed with Consultants/Other Providers:    Contact Number: [employer]ra [phone]
Patient is a 89y old  Female who presents with a chief complaint of STEMI transfer from [facility] (17 May 2018 04:38)        SUBJECTIVE / OVERNIGHT EVENTS:  overnight, no acute events.  pt seen at bedside with RN.  denies any complaints.    CAPILLARY BLOOD GLUCOSE    I&O's Summary    19 May 2018 07:01  -  20 May 2018 07:00  --------------------------------------------------------  IN: 830 mL / OUT: 2 mL / NET: 828 mL    Vital Signs Last 24 Hrs  T(C): 36.8 (20 May 2018 11:33), Max: 36.8 (19 May 2018 21:37)  T(F): 98.3 (20 May 2018 11:33), Max: 98.3 (20 May 2018 11:33)  HR: 88 (20 May 2018 11:33) (88 - 106)  BP: 160/68 (20 May 2018 11:33) (134/75 - 179/72)  BP(mean): --  RR: 17 (20 May 2018 11:33) (17 - 20)  SpO2: 97% (20 May 2018 11:33) (95% - 97%)    PHYSICAL EXAM:  GENERAL:  Chronically ill appearing thin elderly F, in NAD, w/ thick mucous production in throat  HEAD:  NCAT  EYES: PERRLA, conjunctiva clear  NECK: Supple  CHEST/LUNG: CTA B/L. No w/r/r.  HEART: Reg rate. Normal S1, S2. III/VI systollic murmur at RUSB.  ABDOMEN: SNTND. Bowel sounds present  EXTREMITIES:  2+ Peripheral Pulses, No clubbing, cyanosis, edema.  darkened erythema of b/l shins  PSYCH: pleasant, unable to assess    LABS:                        9.8    12.26 )-----------( 285      ( 20 May 2018 07:54 )             30.7     05-20    146<H>  |  109<H>  |  23  ----------------------------<  165<H>  3.8   |  24  |  0.69    Ca    9.1      20 May 2018 06:00  Mg     2.6     05-20    RADIOLOGY & ADDITIONAL TESTS:  Telemetry reviewed: sinus 80s-100s, pvcs, pats  Consultant(s) Notes Reviewed:  s&s  Care Discussed with Consultants/Other Providers: Floor NP    MEDICATIONS  (STANDING):  ALBUTerol/ipratropium for Nebulization 3 milliLiter(s) Nebulizer every 6 hours  aspirin  chewable 81 milliGRAM(s) Oral daily  heparin  Injectable 5000 Unit(s) SubCutaneous every 8 hours  metoprolol tartrate 50 milliGRAM(s) Oral <User Schedule>  metoprolol tartrate 25 milliGRAM(s) Oral at bedtime  piperacillin/tazobactam IVPB. 3.375 Gram(s) IV Intermittent every 8 hours  sertraline 50 milliGRAM(s) Oral daily    MEDICATIONS  (PRN):

## 2018-05-21 NOTE — DIETITIAN INITIAL EVALUATION ADULT. - ORAL INTAKE PTA
Daughter reports pt with geenrally fair po intake, 3 days PTA very poor intake. States po intake has also declined in the hospital as she dislikes some of the food and the pureed consistency. Endorses mushroom allergy./fair

## 2018-05-21 NOTE — PROGRESS NOTE ADULT - PROBLEM SELECTOR PLAN 4
tachypnea resolved, no longer on O2  - Chest percussion therapy w/ vest and physical chest PT ordered to help mobilize secretions  - suction secretions q6h  - now off O2 nasal cannula, monitor off to keep O2>90%

## 2018-05-21 NOTE — DISCHARGE NOTE ADULT - CARE PROVIDER_API CALL
Jo-Ann Randolph), Family Medicine  [address] [place] [address]  Phone: [phone]  Fax: [phone]

## 2018-05-21 NOTE — DISCHARGE NOTE ADULT - HOSPITAL COURSE
Assessment and Plan:   · Assessment	  88 Y/O F PMH htn, severe dementia (aaox1), severe AS, HTN sent from [facility] w/ SOB +Weakness found questionable + stemi and tx for cath, found to have NSTEMI, sepsis +RVP for rhino/entero w/ superimposed aspiration PNA, acute hypoxic resp failure, now improving, pending home hospice.    ·  Problem: NSTEMI (non-ST elevated myocardial infarction).  Plan: presented with NSTEMI likely demand ischemia from infection. Currently no CP  - c/w ASA, home metoprolol as per schedule  - seen by cardiology - recommended for palliative f/u.   ·  Problem: PNA (pneumonia).  Plan: due to enterovirus infection with likely superimposed aspiration PNA- per daughter, pt does have coughing and clearance after liquids/food  sepsis on admission, now resolving, lactic acidosis resolved  - d/w S&S, family not interested in MBS at this time. will hold of on further eval and pursue comfort feeds   - c/w Zosyn 5/17- for 5 d course to finish today 5/21; blood cx's are negative  - monitor leukocytosis stabilizing 11-14, likely due to continued aspiration and family HCP prefer comfort feeds and no formal MBS.  - duonebs q6h atc  - symptomatic support.  ·  Problem: Hypokalemia.  Plan: improved s/p repletion  likely due to poor po intake  - monitor  - monitor k and mg.   ·  Problem: Acute respiratory failure with hypoxia.  Plan: tachypnea resolved, no longer on O2  - Chest percussion therapy w/ vest and physical chest PT ordered to help mobilize secretions  - suction secretions q6h  - now off O2 nasal cannula, monitor off to keep O2>90%.     ·  Problem: Aspiration into respiratory tract.  Plan: - C/w conservative diet: honey consistency dysphagia I diet.  - S/S eval - was scheduled for MBS, but after d/w HCP no longer would like this as they would prefer comfort feeds for patient  - daughters are HCP and are clear they do not want PEG for their mom. They understand pt is high aspiration risk and that even the most conservative diet can predispose pt to aspiration, respiratory distress, PNA and even death. They are content with trying the current diet, but if pt gives resistance or dislikes the diet, they would like to liberalize diet to provide quality for the pt.     Problem: Severe aortic stenosis. Plan: as per TTE 5/2018  - palliative follow up, MOLST form filled, DNR/DNI  - seen by cards.  ·  Problem: Essential hypertension.  Plan: moderate control  - c/w home metoprolol  - will be conservative with bp management given age and comorbidities.      Problem/Plan - 8:  ·  Problem: Depression.  Plan: - c/w sertraline.      Problem/Plan - 9:  ·  Problem: Advanced care planning/counseling discussion.  Plan: Pt is DNR/DNI; palliative care recs appreciated  - pt was referred for hospice eval, d/w hospice RN to set up for home hospice and visiting nurse.   Problem: Need for prophylactic measure. Plan; DVT ppx w/ heparin sq  f/u hospice referral for home hospice  d/w Brianne at bedside  dispo planning: to home possibly today 5/21 with hospice services after completion of zosyn abx. 89 Y/O F PMH htn, severe dementia (aaox1), severe AS, HTN sent from [facility] w/ SOB +Weakness found questionable + stemi and tx for cath, found to have NSTEMI, severe sepsis +RVP for rhino/entero w/ superimposed aspiration PNA, acute hypoxic resp failure, now improving, no longer requiring oxygen. Pt presented with NSTEMI likely demand ischemia from infection. Seen by cardio, recommended palliative care and medical management. palliative evaluated pt, MOLST signed and pt DNR/DNI and for comfort measures. Pt with sepsis on admission, which resolved with resolution of lactic acidosis. S&S eval'ed for aspiration, however after discussion with daughter, HCP family not interested in MBS at this time and preferred to hold off on further eval and pursue comfort feeds. s/p 5 day Zosyn 5/17-5/21, bld cx's negative. Leukocytosis stabilizing 11-14, likely due to continued aspiration and family HCP prefer comfort feeds and no formal MBS. Symptomatic support provided. With Hypokalemia improved s/p repletion, likely due to poor PO intake. with hyponatremia and hypernatremia, which resolved. Anemia stable. JACEY on admission which resolved on admission. Seen by nutrition, diagnosed with severe protein malnutrition. With new Severe aortic stenosis. as per TTE 5/2018, seen by cards, managing conservatively given goals of care. Seen with daughter, HCP at bedside, hospice referral placed and accepted to home hospice with visiting nurse services.

## 2018-05-21 NOTE — DIETITIAN INITIAL EVALUATION ADULT. - PT NOT SOURCE
Pt with advanced dementia, is noted as confused and disoriented; interview deferred to daughter at bedside

## 2018-05-21 NOTE — DIETITIAN INITIAL EVALUATION ADULT. - ENERGY NEEDS
ht: 57 inches. wt: 108.4 pounds (current as of 5/18, standing, no edema noted). BMI: 23.4 kG/m2. UBW: 126 pounds x 7 years ago. IBW: 85 pounds +/- 10%. %IBW: 127%  Other pertinent objective information: 89 year old female pt with PMH HTN, advanced dementia presented with SOB and weakness found to have STEMI now medically managed, sepsis +RVP for rhino/entero w/ superimposed aspiration PNA with last dose of antibiotic to be completed today, acute hypoxic resp failure now improving, planned for d/c today with home hospice. Per GOC discussion, documented that family does not want MBS or PEG and is pursuing pleasure feeds only. No pressure ulcers noted. Elevated BUN, Chloride, Magnesium noted. Last BM 5/19, noted with fecal incontinence.

## 2018-05-22 LAB
CULTURE RESULTS: SIGNIFICANT CHANGE UP
SPECIMEN SOURCE: SIGNIFICANT CHANGE UP

## 2018-09-07 NOTE — PROVIDER CONTACT NOTE (OTHER) - ASSESSMENT
Asymptomatic. Resting in recliner, daughter at bedside. /68 HR 95 RR 20 SpO2 95 on RA.
Pt alert and oriented x1   oxygen saturation between 85-88% on room air
Pt denies chest pain/palpitations. VSS. 97.5F HR 87 /58 RR 16 93% on room air
Domestic partner

## 2019-08-03 NOTE — SWALLOW BEDSIDE ASSESSMENT ADULT - MODE OF PRESENTATION
No pertinent past medical history <<----- Click to add NO pertinent Past Medical History cup/self fed/spoon/fed by clinician

## 2022-01-26 NOTE — H&P ADULT - NSCORESITESY/N_GEN_A_CORE_RD
Post-Care Instructions: I reviewed with the patient in detail post-care instructions. Patient is to wear sunprotection, and avoid picking at any of the treated lesions. Pt may apply Vaseline to crusted or scabbing areas. Show Applicator Variable?: Yes Consent: The patient's consent was obtained including but not limited to risks of crusting, scabbing, blistering, scarring, darker or lighter pigmentary change, recurrence, incomplete removal and infection. Render Note In Bullet Format When Appropriate: No No Detail Level: Detailed Duration Of Freeze Thaw-Cycle (Seconds): 0

## 2022-02-01 NOTE — ED ADULT NURSE NOTE - CCCP TRG CHIEF CMPLNT
weakness Consent: The patient's consent was obtained including but not limited to risks of crusting, scabbing, blistering, scarring, darker or lighter pigmentary change, recurrence, incomplete removal and infection. Post-Care Instructions: I reviewed with the patient in detail post-care instructions. Patient is to wear sunprotection, and avoid picking at any of the treated lesions. Pt may apply Vaseline to crusted or scabbing areas. Show Aperture Variable?: Yes Detail Level: Detailed Duration Of Freeze Thaw-Cycle (Seconds): 5 Number Of Freeze-Thaw Cycles: 1 freeze-thaw cycle Render Note In Bullet Format When Appropriate: No

## 2023-04-20 NOTE — ED ADULT NURSE NOTE - CAS TRG GEN SKIN CONDITION
Identified pt with two pt identifiers(name and ). Chief Complaint   Patient presents with    Diabetes     Patient is here for a diabetes follow up, patient was told to stop taking hydrochlorothiazide in hospital due to potassium being high and needs to know if a BP medication is needed or not as she has not had it since 3/21     Headache     Patient stated she has a headache off and on for about a week         Health Maintenance Due   Topic    Eye Exam Retinal or Dilated        Wt Readings from Last 3 Encounters:   23 174 lb 12.8 oz (79.3 kg)   23 183 lb 3.2 oz (83.1 kg)   23 179 lb (81.2 kg)     Temp Readings from Last 3 Encounters:   23 97.4 °F (36.3 °C) (Temporal)   23 97.6 °F (36.4 °C) (Temporal)   23 98.7 °F (37.1 °C)     BP Readings from Last 3 Encounters:   23 (!) 190/76   23 (!) 160/80   23 (!) 144/58     Pulse Readings from Last 3 Encounters:   23 73   23 70   23 82         Learning Assessment:  :     Learning Assessment 2016   PRIMARY LEARNER Patient   HIGHEST LEVEL OF EDUCATION - PRIMARY LEARNER  GRADUATED HIGH SCHOOL OR GED   BARRIERS PRIMARY LEARNER NONE   CO-LEARNER CAREGIVER No   PRIMARY LANGUAGE ENGLISH   LEARNER PREFERENCE PRIMARY OTHER (COMMENT)   ANSWERED BY self   RELATIONSHIP SELF       Depression Screening:  :     3 most recent PHQ Screens 2023   Little interest or pleasure in doing things Not at all   Feeling down, depressed, irritable, or hopeless Not at all   Total Score PHQ 2 0       Fall Risk Assessment:  :     Fall Risk Assessment, last 12 mths 2021   Able to walk? Yes   Fall in past 12 months? 0   Do you feel unsteady? 0   Are you worried about falling 0       Abuse Screening:  :     Abuse Screening Questionnaire 2023 10/18/2022 2021 10/5/2020 2019 2018 2016   Do you ever feel afraid of your partner?  N N N N N N N   Are you in a relationship with someone who physically or mentally threatens you? N N N N N N N   Is it safe for you to go home? Y Y Y Y Y Y Y       Coordination of Care Questionnaire:  :     1) Have you been to an emergency room, urgent care clinic since your last visit? no   Hospitalized since your last visit? no             2) Have you seen or consulted any other health care providers outside of [address] [place] [address] since your last visit? no  (Include any pap smears or colon screenings in this section.)    3) Do you have an Advance Directive on file? no  Are you interested in receiving information about Advance Directives? no    Patient is accompanied by self I have received verbal consent from Wilton Gonzalez to discuss any/all medical information while they are present in the room. 4.  For patients aged 39-70: Has the patient had a colonoscopy / FIT/ Cologuard? Yes - no Care Gap present      If the patient is female:    5. For patients aged 41-77: Has the patient had a mammogram within the past 2 years? Yes - no Care Gap present      6. For patients aged 21-65: Has the patient had a pap smear?  Yes - no Care Gap present Warm

## 2024-11-10 NOTE — CONSULT NOTE ADULT - PROBLEM SELECTOR RECOMMENDATION 4
Addison Yoon needs to be seen for an appointment before further refills are given. Called and LVM per note  
No
Discussed with daughter Brianne. Patient appears comfortable from a symptom perspective. Daughter is open to hospice, unclear if she would definitively qualify based on her dementia symptoms (FAST 6C); discussed with hospice who will set up a meeting with family.

## 2025-04-19 NOTE — PROGRESS NOTE ADULT - PROBLEM SELECTOR PROBLEM 8
Isotretinoin Counseling: Patient should get monthly blood tests, not donate blood, not drive at night if vision affected, not share medication, and not undergo elective surgery for 6 months after tx completed. Side effects reviewed, pt to contact office should one occur. Topical Clindamycin Counseling: Patient counseled that this medication may cause skin irritation or allergic reactions.  In the event of skin irritation, the patient was advised to reduce the amount of the drug applied or use it less frequently.   The patient verbalized understanding of the proper use and possible adverse effects of clindamycin.  All of the patient's questions and concerns were addressed. Spironolactone Pregnancy And Lactation Text: This medication can cause feminization of the male fetus and should be avoided during pregnancy. The active metabolite is also found in breast milk. Tazorac Counseling:  Patient advised that medication is irritating and drying.  Patient may need to apply sparingly and wash off after an hour before eventually leaving it on overnight.  The patient verbalized understanding of the proper use and possible adverse effects of tazorac.  All of the patient's questions and concerns were addressed. Doxycycline Counseling:  Patient counseled regarding possible photosensitivity and increased risk for sunburn.  Patient instructed to avoid sunlight, if possible.  When exposed to sunlight, patients should wear protective clothing, sunglasses, and sunscreen.  The patient was instructed to call the office immediately if the following severe adverse effects occur:  hearing changes, easy bruising/bleeding, severe headache, or vision changes.  The patient verbalized understanding of the proper use and possible adverse effects of doxycycline.  All of the patient's questions and concerns were addressed. Erythromycin Counseling:  I discussed with the patient the risks of erythromycin including but not limited to GI upset, allergic reaction, drug rash, diarrhea, increase in liver enzymes, and yeast infections. Bactrim Pregnancy And Lactation Text: This medication is Pregnancy Category D and is known to cause fetal risk.  It is also excreted in breast milk. Tetracycline Pregnancy And Lactation Text: This medication is Pregnancy Category D and not consider safe during pregnancy. It is also excreted in breast milk. Azithromycin Pregnancy And Lactation Text: This medication is considered safe during pregnancy and is also secreted in breast milk. High Dose Vitamin A Counseling: Side effects reviewed, pt to contact office should one occur. Aklief counseling:  Patient advised to apply a pea-sized amount only at bedtime and wait 30 minutes after washing their face before applying.  If too drying, patient may add a non-comedogenic moisturizer.  The most commonly reported side effects including irritation, redness, scaling, dryness, stinging, burning, itching, and increased risk of sunburn.  The patient verbalized understanding of the proper use and possible adverse effects of retinoids.  All of the patient's questions and concerns were addressed. Detail Level: Detailed Topical Sulfur Applications Pregnancy And Lactation Text: This medication is Pregnancy Category C and has an unknown safety profile during pregnancy. It is unknown if this topical medication is excreted in breast milk. High Dose Vitamin A Pregnancy And Lactation Text: High dose vitamin A therapy is contraindicated during pregnancy and breast feeding. Benzoyl Peroxide Counseling: Patient counseled that medicine may cause skin irritation and bleach clothing.  In the event of skin irritation, the patient was advised to reduce the amount of the drug applied or use it less frequently.   The patient verbalized understanding of the proper use and possible adverse effects of benzoyl peroxide.  All of the patient's questions and concerns were addressed. Birth Control Pills Counseling: Birth Control Pill Counseling: I discussed with the patient the potential side effects of OCPs including but not limited to increased risk of stroke, heart attack, thrombophlebitis, deep venous thrombosis, hepatic adenomas, breast changes, GI upset, headaches, and depression.  The patient verbalized understanding of the proper use and possible adverse effects of OCPs. All of the patient's questions and concerns were addressed. Topical Retinoid counseling:  Patient advised to apply a pea-sized amount only at bedtime and wait 30 minutes after washing their face before applying.  If too drying, patient may add a non-comedogenic moisturizer. The patient verbalized understanding of the proper use and possible adverse effects of retinoids.  All of the patient's questions and concerns were addressed. Include Pregnancy/Lactation Warning?: No Dapsone Counseling: I discussed with the patient the risks of dapsone including but not limited to hemolytic anemia, agranulocytosis, rashes, methemoglobinemia, kidney failure, peripheral neuropathy, headaches, GI upset, and liver toxicity.  Patients who start dapsone require monitoring including baseline LFTs and weekly CBCs for the first month, then every month thereafter.  The patient verbalized understanding of the proper use and possible adverse effects of dapsone.  All of the patient's questions and concerns were addressed. Azelaic Acid Counseling: Patient counseled that medicine may cause skin irritation and to avoid applying near the eyes.  In the event of skin irritation, the patient was advised to reduce the amount of the drug applied or use it less frequently.   The patient verbalized understanding of the proper use and possible adverse effects of azelaic acid.  All of the patient's questions and concerns were addressed. Winlevi Pregnancy And Lactation Text: This medication is considered safe during pregnancy and breastfeeding. Spironolactone Counseling: Patient advised regarding risks of diarrhea, abdominal pain, hyperkalemia, birth defects (for female patients), liver toxicity and renal toxicity. The patient may need blood work to monitor liver and kidney function and potassium levels while on therapy. The patient verbalized understanding of the proper use and possible adverse effects of spironolactone.  All of the patient's questions and concerns were addressed. Tazorac Pregnancy And Lactation Text: This medication is not safe during pregnancy. It is unknown if this medication is excreted in breast milk. Erythromycin Pregnancy And Lactation Text: This medication is Pregnancy Category B and is considered safe during pregnancy. It is also excreted in breast milk. Azithromycin Counseling:  I discussed with the patient the risks of azithromycin including but not limited to GI upset, allergic reaction, drug rash, diarrhea, and yeast infections. Need for prophylactic measure Bactrim Counseling:  I discussed with the patient the risks of sulfa antibiotics including but not limited to GI upset, allergic reaction, drug rash, diarrhea, dizziness, photosensitivity, and yeast infections.  Rarely, more serious reactions can occur including but not limited to aplastic anemia, agranulocytosis, methemoglobinemia, blood dyscrasias, liver or kidney failure, lung infiltrates or desquamative/blistering drug rashes. Doxycycline Pregnancy And Lactation Text: This medication is Pregnancy Category D and not consider safe during pregnancy. It is also excreted in breast milk but is considered safe for shorter treatment courses. Isotretinoin Pregnancy And Lactation Text: This medication is Pregnancy Category X and is considered extremely dangerous during pregnancy. It is unknown if it is excreted in breast milk. Tetracycline Counseling: Patient counseled regarding possible photosensitivity and increased risk for sunburn.  Patient instructed to avoid sunlight, if possible.  When exposed to sunlight, patients should wear protective clothing, sunglasses, and sunscreen.  The patient was instructed to call the office immediately if the following severe adverse effects occur:  hearing changes, easy bruising/bleeding, severe headache, or vision changes.  The patient verbalized understanding of the proper use and possible adverse effects of tetracycline.  All of the patient's questions and concerns were addressed. Patient understands to avoid pregnancy while on therapy due to potential birth defects. Topical Clindamycin Pregnancy And Lactation Text: This medication is Pregnancy Category B and is considered safe during pregnancy. It is unknown if it is excreted in breast milk. Minocycline Counseling: Patient advised regarding possible photosensitivity and discoloration of the teeth, skin, lips, tongue and gums.  Patient instructed to avoid sunlight, if possible.  When exposed to sunlight, patients should wear protective clothing, sunglasses, and sunscreen.  The patient was instructed to call the office immediately if the following severe adverse effects occur:  hearing changes, easy bruising/bleeding, severe headache, or vision changes.  The patient verbalized understanding of the proper use and possible adverse effects of minocycline.  All of the patient's questions and concerns were addressed. Aklief Pregnancy And Lactation Text: It is unknown if this medication is safe to use during pregnancy.  It is unknown if this medication is excreted in breast milk.  Breastfeeding women should use the topical cream on the smallest area of the skin for the shortest time needed while breastfeeding.  Do not apply to nipple and areola. Winlevi Counseling:  I discussed with the patient the risks of topical clascoterone including but not limited to erythema, scaling, itching, and stinging. Patient voiced their understanding. Benzoyl Peroxide Pregnancy And Lactation Text: This medication is Pregnancy Category C. It is unknown if benzoyl peroxide is excreted in breast milk. Topical Sulfur Applications Counseling: Topical Sulfur Counseling: Patient counseled that this medication may cause skin irritation or allergic reactions.  In the event of skin irritation, the patient was advised to reduce the amount of the drug applied or use it less frequently.   The patient verbalized understanding of the proper use and possible adverse effects of topical sulfur application.  All of the patient's questions and concerns were addressed. Azelaic Acid Pregnancy And Lactation Text: This medication is considered safe during pregnancy and breast feeding. Dapsone Pregnancy And Lactation Text: This medication is Pregnancy Category C and is not considered safe during pregnancy or breast feeding. Birth Control Pills Pregnancy And Lactation Text: This medication should be avoided if pregnant and for the first 30 days post-partum. Sarecycline Counseling: Patient advised regarding possible photosensitivity and discoloration of the teeth, skin, lips, tongue and gums.  Patient instructed to avoid sunlight, if possible.  When exposed to sunlight, patients should wear protective clothing, sunglasses, and sunscreen.  The patient was instructed to call the office immediately if the following severe adverse effects occur:  hearing changes, easy bruising/bleeding, severe headache, or vision changes.  The patient verbalized understanding of the proper use and possible adverse effects of sarecycline.  All of the patient's questions and concerns were addressed. Topical Retinoid Pregnancy And Lactation Text: This medication is Pregnancy Category C. It is unknown if this medication is excreted in breast milk. Depression